# Patient Record
Sex: MALE | Race: ASIAN | NOT HISPANIC OR LATINO | URBAN - METROPOLITAN AREA
[De-identification: names, ages, dates, MRNs, and addresses within clinical notes are randomized per-mention and may not be internally consistent; named-entity substitution may affect disease eponyms.]

---

## 2018-04-03 ENCOUNTER — INPATIENT (INPATIENT)
Facility: HOSPITAL | Age: 83
LOS: 5 days | Discharge: HOME | End: 2018-04-09
Attending: HOSPITALIST | Admitting: HOSPITALIST
Payer: MEDICAID

## 2018-04-03 VITALS — HEART RATE: 69 BPM | DIASTOLIC BLOOD PRESSURE: 90 MMHG | SYSTOLIC BLOOD PRESSURE: 151 MMHG | OXYGEN SATURATION: 99 %

## 2018-04-03 DIAGNOSIS — I63.9 CEREBRAL INFARCTION, UNSPECIFIED: ICD-10-CM

## 2018-04-03 DIAGNOSIS — Z98.41 CATARACT EXTRACTION STATUS, RIGHT EYE: Chronic | ICD-10-CM

## 2018-04-03 DIAGNOSIS — E11.9 TYPE 2 DIABETES MELLITUS WITHOUT COMPLICATIONS: ICD-10-CM

## 2018-04-03 DIAGNOSIS — I48.91 UNSPECIFIED ATRIAL FIBRILLATION: ICD-10-CM

## 2018-04-03 DIAGNOSIS — I10 ESSENTIAL (PRIMARY) HYPERTENSION: ICD-10-CM

## 2018-04-03 DIAGNOSIS — I48.92 UNSPECIFIED ATRIAL FLUTTER: ICD-10-CM

## 2018-04-03 DIAGNOSIS — Z29.9 ENCOUNTER FOR PROPHYLACTIC MEASURES, UNSPECIFIED: ICD-10-CM

## 2018-04-03 LAB
ALBUMIN SERPL ELPH-MCNC: 4.1 G/DL — SIGNIFICANT CHANGE UP (ref 3.3–5)
ALP SERPL-CCNC: 50 U/L — SIGNIFICANT CHANGE UP (ref 40–120)
ALT FLD-CCNC: 23 U/L — SIGNIFICANT CHANGE UP (ref 4–41)
APTT BLD: 32.5 SEC — SIGNIFICANT CHANGE UP (ref 27.5–37.4)
AST SERPL-CCNC: 21 U/L — SIGNIFICANT CHANGE UP (ref 4–40)
BASOPHILS # BLD AUTO: 0.05 K/UL — SIGNIFICANT CHANGE UP (ref 0–0.2)
BASOPHILS NFR BLD AUTO: 0.7 % — SIGNIFICANT CHANGE UP (ref 0–2)
BILIRUB SERPL-MCNC: 0.5 MG/DL — SIGNIFICANT CHANGE UP (ref 0.2–1.2)
BLD GP AB SCN SERPL QL: NEGATIVE — SIGNIFICANT CHANGE UP
BUN SERPL-MCNC: 24 MG/DL — HIGH (ref 7–23)
CALCIUM SERPL-MCNC: 8.9 MG/DL — SIGNIFICANT CHANGE UP (ref 8.4–10.5)
CHLORIDE SERPL-SCNC: 98 MMOL/L — SIGNIFICANT CHANGE UP (ref 98–107)
CK MB BLD-MCNC: 2.5 — SIGNIFICANT CHANGE UP (ref 0–2.5)
CK MB BLD-MCNC: 3.03 NG/ML — SIGNIFICANT CHANGE UP (ref 1–6.6)
CK MB BLD-MCNC: 3.99 NG/ML — SIGNIFICANT CHANGE UP (ref 1–6.6)
CK MB BLD-MCNC: SIGNIFICANT CHANGE UP (ref 0–2.5)
CK SERPL-CCNC: 123 U/L — SIGNIFICANT CHANGE UP (ref 30–200)
CK SERPL-CCNC: 162 U/L — SIGNIFICANT CHANGE UP (ref 30–200)
CO2 SERPL-SCNC: 24 MMOL/L — SIGNIFICANT CHANGE UP (ref 22–31)
CREAT SERPL-MCNC: 1.43 MG/DL — HIGH (ref 0.5–1.3)
EOSINOPHIL # BLD AUTO: 0.21 K/UL — SIGNIFICANT CHANGE UP (ref 0–0.5)
EOSINOPHIL NFR BLD AUTO: 2.8 % — SIGNIFICANT CHANGE UP (ref 0–6)
GLUCOSE SERPL-MCNC: 173 MG/DL — HIGH (ref 70–99)
HBA1C BLD-MCNC: 7.9 % — HIGH (ref 4–5.6)
HCT VFR BLD CALC: 42.6 % — SIGNIFICANT CHANGE UP (ref 39–50)
HGB BLD-MCNC: 13.9 G/DL — SIGNIFICANT CHANGE UP (ref 13–17)
IMM GRANULOCYTES # BLD AUTO: 0.04 # — SIGNIFICANT CHANGE UP
IMM GRANULOCYTES NFR BLD AUTO: 0.5 % — SIGNIFICANT CHANGE UP (ref 0–1.5)
INR BLD: 1.1 — SIGNIFICANT CHANGE UP (ref 0.88–1.17)
LYMPHOCYTES # BLD AUTO: 2.69 K/UL — SIGNIFICANT CHANGE UP (ref 1–3.3)
LYMPHOCYTES # BLD AUTO: 35.7 % — SIGNIFICANT CHANGE UP (ref 13–44)
MCHC RBC-ENTMCNC: 30.2 PG — SIGNIFICANT CHANGE UP (ref 27–34)
MCHC RBC-ENTMCNC: 32.6 % — SIGNIFICANT CHANGE UP (ref 32–36)
MCV RBC AUTO: 92.4 FL — SIGNIFICANT CHANGE UP (ref 80–100)
MONOCYTES # BLD AUTO: 0.93 K/UL — HIGH (ref 0–0.9)
MONOCYTES NFR BLD AUTO: 12.4 % — SIGNIFICANT CHANGE UP (ref 2–14)
NEUTROPHILS # BLD AUTO: 3.61 K/UL — SIGNIFICANT CHANGE UP (ref 1.8–7.4)
NEUTROPHILS NFR BLD AUTO: 47.9 % — SIGNIFICANT CHANGE UP (ref 43–77)
NRBC # FLD: 0 — SIGNIFICANT CHANGE UP
PLATELET # BLD AUTO: 235 K/UL — SIGNIFICANT CHANGE UP (ref 150–400)
PMV BLD: 9.5 FL — SIGNIFICANT CHANGE UP (ref 7–13)
POTASSIUM SERPL-MCNC: 4.1 MMOL/L — SIGNIFICANT CHANGE UP (ref 3.5–5.3)
POTASSIUM SERPL-SCNC: 4.1 MMOL/L — SIGNIFICANT CHANGE UP (ref 3.5–5.3)
PROT SERPL-MCNC: 7.5 G/DL — SIGNIFICANT CHANGE UP (ref 6–8.3)
PROTHROM AB SERPL-ACNC: 12.2 SEC — SIGNIFICANT CHANGE UP (ref 9.8–13.1)
RBC # BLD: 4.61 M/UL — SIGNIFICANT CHANGE UP (ref 4.2–5.8)
RBC # FLD: 14.6 % — HIGH (ref 10.3–14.5)
RH IG SCN BLD-IMP: POSITIVE — SIGNIFICANT CHANGE UP
SODIUM SERPL-SCNC: 135 MMOL/L — SIGNIFICANT CHANGE UP (ref 135–145)
TROPONIN T SERPL-MCNC: < 0.06 NG/ML — SIGNIFICANT CHANGE UP (ref 0–0.06)
TROPONIN T SERPL-MCNC: < 0.06 NG/ML — SIGNIFICANT CHANGE UP (ref 0–0.06)
WBC # BLD: 7.53 K/UL — SIGNIFICANT CHANGE UP (ref 3.8–10.5)
WBC # FLD AUTO: 7.53 K/UL — SIGNIFICANT CHANGE UP (ref 3.8–10.5)

## 2018-04-03 PROCEDURE — 99223 1ST HOSP IP/OBS HIGH 75: CPT

## 2018-04-03 PROCEDURE — 70450 CT HEAD/BRAIN W/O DYE: CPT | Mod: 26

## 2018-04-03 PROCEDURE — 71046 X-RAY EXAM CHEST 2 VIEWS: CPT | Mod: 26

## 2018-04-03 RX ORDER — ASPIRIN/CALCIUM CARB/MAGNESIUM 324 MG
300 TABLET ORAL ONCE
Qty: 0 | Refills: 0 | Status: COMPLETED | OUTPATIENT
Start: 2018-04-03 | End: 2018-04-03

## 2018-04-03 RX ORDER — INSULIN LISPRO 100/ML
VIAL (ML) SUBCUTANEOUS AT BEDTIME
Qty: 0 | Refills: 0 | Status: DISCONTINUED | OUTPATIENT
Start: 2018-04-03 | End: 2018-04-09

## 2018-04-03 RX ORDER — GLUCAGON INJECTION, SOLUTION 0.5 MG/.1ML
1 INJECTION, SOLUTION SUBCUTANEOUS ONCE
Qty: 0 | Refills: 0 | Status: DISCONTINUED | OUTPATIENT
Start: 2018-04-03 | End: 2018-04-09

## 2018-04-03 RX ORDER — INSULIN LISPRO 100/ML
VIAL (ML) SUBCUTANEOUS
Qty: 0 | Refills: 0 | Status: DISCONTINUED | OUTPATIENT
Start: 2018-04-03 | End: 2018-04-09

## 2018-04-03 RX ORDER — SODIUM CHLORIDE 9 MG/ML
1000 INJECTION INTRAMUSCULAR; INTRAVENOUS; SUBCUTANEOUS
Qty: 0 | Refills: 0 | Status: DISCONTINUED | OUTPATIENT
Start: 2018-04-03 | End: 2018-04-05

## 2018-04-03 RX ORDER — IPRATROPIUM/ALBUTEROL SULFATE 18-103MCG
3 AEROSOL WITH ADAPTER (GRAM) INHALATION EVERY 6 HOURS
Qty: 0 | Refills: 0 | Status: DISCONTINUED | OUTPATIENT
Start: 2018-04-03 | End: 2018-04-09

## 2018-04-03 RX ORDER — METFORMIN HYDROCHLORIDE 850 MG/1
0 TABLET ORAL
Qty: 0 | Refills: 0 | COMMUNITY

## 2018-04-03 RX ORDER — HEPARIN SODIUM 5000 [USP'U]/ML
5000 INJECTION INTRAVENOUS; SUBCUTANEOUS EVERY 8 HOURS
Qty: 0 | Refills: 0 | Status: DISCONTINUED | OUTPATIENT
Start: 2018-04-03 | End: 2018-04-09

## 2018-04-03 RX ORDER — ASPIRIN/CALCIUM CARB/MAGNESIUM 324 MG
300 TABLET ORAL DAILY
Qty: 0 | Refills: 0 | Status: DISCONTINUED | OUTPATIENT
Start: 2018-04-04 | End: 2018-04-05

## 2018-04-03 RX ORDER — DEXTROSE 50 % IN WATER 50 %
12.5 SYRINGE (ML) INTRAVENOUS ONCE
Qty: 0 | Refills: 0 | Status: DISCONTINUED | OUTPATIENT
Start: 2018-04-03 | End: 2018-04-09

## 2018-04-03 RX ORDER — DEXTROSE 50 % IN WATER 50 %
1 SYRINGE (ML) INTRAVENOUS ONCE
Qty: 0 | Refills: 0 | Status: DISCONTINUED | OUTPATIENT
Start: 2018-04-03 | End: 2018-04-09

## 2018-04-03 RX ORDER — ASPIRIN/CALCIUM CARB/MAGNESIUM 324 MG
324 TABLET ORAL ONCE
Qty: 0 | Refills: 0 | Status: DISCONTINUED | OUTPATIENT
Start: 2018-04-03 | End: 2018-04-03

## 2018-04-03 RX ORDER — DEXTROSE 50 % IN WATER 50 %
25 SYRINGE (ML) INTRAVENOUS ONCE
Qty: 0 | Refills: 0 | Status: DISCONTINUED | OUTPATIENT
Start: 2018-04-03 | End: 2018-04-09

## 2018-04-03 RX ADMIN — Medication 300 MILLIGRAM(S): at 15:33

## 2018-04-03 RX ADMIN — SODIUM CHLORIDE 50 MILLILITER(S): 9 INJECTION INTRAMUSCULAR; INTRAVENOUS; SUBCUTANEOUS at 19:19

## 2018-04-03 RX ADMIN — HEPARIN SODIUM 5000 UNIT(S): 5000 INJECTION INTRAVENOUS; SUBCUTANEOUS at 23:13

## 2018-04-03 NOTE — ED ADULT TRIAGE NOTE - CHIEF COMPLAINT QUOTE
pt brought form home by EMS s/p call from son stating pt began taking "Strange" approx 1 hour ago, brought directly to CT by EMS

## 2018-04-03 NOTE — CONSULT NOTE ADULT - ASSESSMENT
82 year old Male (unknown PMHx upon presentation) as pt is Malayala speaking and unable to provide hx due to possible cognitive dysfunction BIBEMS as stroke notification for LUE weakness, AMS, slurred speech that began at 12:30PM.  Neurological exam significant for mild L facial droop and dysarthria.  CTH showing no acute abnormalities.  NIHSS 2, MRS 0, tPA not administered as patient out of window.  Symptoms possibly from stroke, will recommend workup    Plan:  Would get MRI brain without contrast to look at the extent and distribution of the stroke   MRA H/N to look at the cerebral vasculature or CTA H/N to look at the cerebral vasculature.   Aspirin for secondary stroke prevention at this time. Atorvastatin 80, titrate the dose according to LDL.   TTE with bubble study and telemetry to look for a cardiac source of embolism.   DVT prophylaxis, Neurochecks  Permissive HTN up to 220/120 mmHg for first 24 hours after the symptom onset followed by gradual normotension.   HbA1C and LDL.   PT/OT/Speech and swallow/safety eval.

## 2018-04-03 NOTE — H&P ADULT - PROBLEM SELECTOR PLAN 2
tele monitor  CHADS2 score=5  Discussed with Neuro fellow: "No full anticoagulation due to acute stroke and high risk of hemorrhagic conversion. Continue ASA low dose."  TTE ordered Newly diagnosed atrial flutter on ECG. At risk of hemorrhagic conversion so now a/c for now.  tele monitor  CHADS2 score=5  Discussed with Neuro fellow: "No full anticoagulation due to acute stroke and high risk of hemorrhagic conversion. Continue ASA low dose."  TTE ordered

## 2018-04-03 NOTE — H&P ADULT - ASSESSMENT
81 yo male PMHx of HTN and DM p/w slurred speech, left facial droop and ataxia admitted to tele for Stroke.    +Stroke-->MRI/MRA Head+Neck, TTE, PT/OT/S&S  +New Afib-->ASA for now, TTE 81 yo male PMHx of HTN and DM p/w slurred speech, left facial droop and ataxia admitted to tele for Stroke.    +Stroke-->MRI/MRA Head+Neck, TTE, PT/OT/S&S  +New Aflutter-->ASA for now, TTE 82 M PMHx of HTN and DM p/w slurred speech, left facial droop and ataxia admitted to tele for Stroke.    +Stroke-->MRI/MRA Head+Neck, TTE, PT/OT/S&S  +New Aflutter-->ASA for now, TTE

## 2018-04-03 NOTE — ED PROVIDER NOTE - MEDICAL DECISION MAKING DETAILS
82M with slurred speech and left facial droop, r/o acute cva. stroke code, stat CT head, check labs and coags, neuro eval.

## 2018-04-03 NOTE — ED PROVIDER NOTE - PROGRESS NOTE DETAILS
Óscar FIT att: 81 yo M (unknown PMHx upon presentation) as pt is Malayala speaking and unable to provide hx due to possible cognitive dysfunction BIBEMS as stroke notification for LUE weakness, AMS, slurred speech that began at 1:30pm according to his 15 yo grandson who is accompanying him. Grandson says that the pt's wife noticed that he was vomiting and pt himself c/o cough. EMS called a code stroke for LUE weakness. In the ED, pt has slight weakness in LUE, L sided facial droop and possible slurred speech during questioning, however, baseline is unknown. FS: 159; vitals wnl; stroke code called and stat CT ordered; neuro resident consulted and present to evaluate pt when in CT; signed out to Dr. Mccall LW: failed bedside S&S eval. will give ASA CA

## 2018-04-03 NOTE — ED PROVIDER NOTE - OBJECTIVE STATEMENT
82M with hx of HTN, cataracts p/w slurred speech and facial droop. patient last seen normal at 930am. Grandmother called her grandson with new sx at 1230pm. Patient offers no complaints. Stroke code called upon arrival, stat CT head obtained, Neuro eval.    No PMD, moved here from [place] 6 months ago  PI# [id number] for Kimberley 82M with hx of HTN, DM2, cataracts p/w slurred speech and facial droop. patient last seen normal at 930am. Grandmother called her grandson with new sx at 1230pm. Patient offers no complaints. Stroke code called upon arrival, stat CT head obtained, Neuro eval.    No PMD, moved here from [place] 6 months ago  PI# [id number] for Kimberley

## 2018-04-03 NOTE — CONSULT NOTE ADULT - SUBJECTIVE AND OBJECTIVE BOX
Neurology Consult    Name  ADAM RITTER    Patient is an 82 year old Male (unknown PMHx upon presentation) as pt is Malayala speaking and unable to provide hx due to possible cognitive dysfunction BIBEMS as stroke notification for LUE weakness, AMS, slurred speech that began at 1:30pm according to his 15 yo grandson who is accompanying him. Grandson says that the pt's wife noticed that he was vomiting and pt himself c/o cough. EMS called a code stroke for LUE weakness. In the ED, pt has slight weakness in LUE, L sided facial droop and possible slurred speech during questioning, however, baseline is unknown.  NIHSS 2, MRS 0, tPA not administered as patient is out of window.                                                          MEDICATIONS  (STANDING):    MEDICATIONS  (PRN):      Allergies      Intolerances        Objective  Vital Signs Last 24 Hrs  T(C): --  T(F): --  HR: 69 (03 Apr 2018 14:00) (69 - 69)  BP: 151/90 (03 Apr 2018 14:00) (151/90 - 151/90)  BP(mean): --  RR: --  SpO2: 99% (03 Apr 2018 14:00) (99% - 99%)    General Exam   General appearance: No acute distress, well-nourished  Respiratory:    non-labored respirations               Neurological Exam  Mental Status:  alert and oriented x3, fluent speech, following commands, repetition and naming intact    Cranial Nerves: PERRL, EOMI without nystagmus, visual fields intact mild L facial droop, moderate dysarthria    Motor:   Tone:   normal               Strength:  Upper extremity                          Delt       Bicep    Tricep                                                  R         5/5        5/5        5/5       5/5                                               L          5/5        5/5        5/5      5/5    Lower extremity                           HF          KE          KF        DF         PF                                               R        5/5        5/5        5/5       5/5       5/5                                               L         5/5        5/5        5/5      5/5        5/5    Pronator drift:   none           Dysmetria: none with finger-to-nose testing  Tremor:  none appreciated at rest or in action    Sensation: intact grossly to light touch      Other Studies        Radiology    CTH:    No acute abnormalities

## 2018-04-03 NOTE — ED ADULT NURSE NOTE - OBJECTIVE STATEMENT
Facilitator :  Receive pt in  spot 5 stroke notification , brought in with change in mental status, , L sided weakness, slurred speech and L sided facial droop now resolving.  Pt is Citizen of the [place]  speaking , grandson at bedside and translated.  CT completed, Neuro at bedside.  IV's placed by EMS and in ED.  Lung sounds clear,.  resp even and unlabored. Skin intact. Facilitator :  Receive pt in  spot 5 stroke notification , brought in with change in mental status, , L sided weakness, slurred speech and L sided facial droop .  Pt is Sw[place]  speaking , grandson at bedside and translated.  CT completed, Neuro at bedside.  IV's placed by EMS and in ED.  Lung sounds clear,.  resp even and unlabored. Skin intact.  Pt NSR on cardiac monitoring

## 2018-04-03 NOTE — H&P ADULT - NEGATIVE CARDIOVASCULAR SYMPTOMS
no chest pain/no dyspnea on exertion/no claudication/no orthopnea/no paroxysmal nocturnal dyspnea/no peripheral edema/no palpitations

## 2018-04-03 NOTE — ED PROVIDER NOTE - CRITICAL CARE PROVIDED
consultation with other physicians/consult w/ pt's family directly relating to pts condition/telephone consultation with the patient's family/additional history taking/direct patient care (not related to procedure)

## 2018-04-03 NOTE — ED PROVIDER NOTE - CONSTITUTIONAL, MLM
normal... Well appearing, well nourished, awake, alert, oriented to person, in no apparent distress.

## 2018-04-03 NOTE — ED ADULT NURSE REASSESSMENT NOTE - NS ED NURSE REASSESS COMMENT FT1
Pt is resting well family is at the bedside pt denies any complaints of having a headache no c/o of nausea or vomiting pt is following commands and moves his extremities  well at this time pt is noted with a left facial droop and has slurred speech, Cardiac monitor Sinus arrythmia noted.

## 2018-04-03 NOTE — H&P ADULT - PROBLEM SELECTOR PLAN 1
tele monitor  House Neuro following  Neuro checks Q4h  MRI + MRA Head and Neck  TTE w/ bubble  PT/OT   NPO (failed dysphagia)	  Speech and Swallow  IVF NS @ 50cc/hr for now  Permissive /210  FLP, HgA1c Presenting with dysarthria. Remains dysarthric and with left facial droop. Potential L MCA abnormality on CT. Neurology recommendations reviewed.  Tele monitor  House Neuro following  Neuro checks Q4h  MRI + MRA Head and Neck  TTE w/ bubble  PT/OT   NPO (failed dysphagia)	  Speech and Swallow  IVF NS @ 50cc/hr for now  Permissive /210  FLP, HgA1c

## 2018-04-03 NOTE — H&P ADULT - MUSCULOSKELETAL
No joint pain, swelling or deformity; no limitation of movement negative details… detailed exam ROM intact/no joint swelling/no joint erythema

## 2018-04-03 NOTE — H&P ADULT - NSHPLABSRESULTS_GEN_ALL_CORE
EKG: Atrial Flutter @ 75 bpm  Delbert x1: neg  BUN/Cr: 24/1.43  CT Head w/o con: Dense left MCA sign cannot be entirely excluded. EKG: Atrial Flutter @ 75 bpm    Delbert x1: neg    BUN/Cr: 24/1.43    CT Head w/o con: Dense left MCA signal cannot be entirely excluded.    Consultant notes reviewed: Neurology

## 2018-04-03 NOTE — ED PROVIDER NOTE - ATTENDING CONTRIBUTION TO CARE
I performed a face to face evaluation of this patient and performed a full history and physical examination on the patient.  I agree with the resident's history, physical examination, and plan of the patient.  Pt called as a code stroke- h/o HTN and DM, no cardiac hx, awake but with apparent slurred speech and left facial droop, heart s1 s2 irregularly irregular, abd soft nontender, neuro with expressive aphasia, left facial droop and possible left hand  weaker but otherwise strength and sensation wnl in ext. for r/o cva, new afib, labs, monitor, ct, admit

## 2018-04-03 NOTE — PATIENT PROFILE ADULT. - CAREGIVER ADDRESS
[facility] [id number]

## 2018-04-03 NOTE — H&P ADULT - HISTORY OF PRESENT ILLNESS
81 yo male PMHx of HTN and DM p/w slurred speech today. Pt last seen normal today @ 0930am. At 1230p pt's wife called grandson because pt wasn't able to stand up. When grandson came to see the pt he noticed pt slurring speech, described as "correct words not coming out properly and talking strange." EMS was called and pt taken to [facility] ED. Pt reports to chronic non-productive coughing and sneezing for "months." No reported changes in vision, headache, confusion, weakness, chest pain, sob, or palpitations. As per son, pt's baseline: can be forgetful at times and delayed speech at times. 82 M PMHx of HTN and DM p/w slurred speech today. Pt last seen normal today @0930am. At 1230p pt's wife called grandson because pt wasn't able to stand up. When grandson came to see the pt he noticed pt slurring speech, described as "correct words not coming out properly and talking strange." EMS was called and pt taken to [facility] ED. Pt reports to chronic non-productive coughing and sneezing for "months." No reported changes in vision, headache, confusion, weakness, chest pain, sob, or palpitations. As per son, pt's baseline: can be forgetful at times and delayed speech at times.

## 2018-04-04 DIAGNOSIS — I63.9 CEREBRAL INFARCTION, UNSPECIFIED: ICD-10-CM

## 2018-04-04 DIAGNOSIS — E11.8 TYPE 2 DIABETES MELLITUS WITH UNSPECIFIED COMPLICATIONS: ICD-10-CM

## 2018-04-04 DIAGNOSIS — I10 ESSENTIAL (PRIMARY) HYPERTENSION: ICD-10-CM

## 2018-04-04 LAB
BUN SERPL-MCNC: 23 MG/DL — SIGNIFICANT CHANGE UP (ref 7–23)
CALCIUM SERPL-MCNC: 9.1 MG/DL — SIGNIFICANT CHANGE UP (ref 8.4–10.5)
CHLORIDE SERPL-SCNC: 98 MMOL/L — SIGNIFICANT CHANGE UP (ref 98–107)
CHOLEST SERPL-MCNC: 260 MG/DL — HIGH (ref 120–199)
CO2 SERPL-SCNC: 27 MMOL/L — SIGNIFICANT CHANGE UP (ref 22–31)
CREAT SERPL-MCNC: 1.23 MG/DL — SIGNIFICANT CHANGE UP (ref 0.5–1.3)
GLUCOSE SERPL-MCNC: 130 MG/DL — HIGH (ref 70–99)
HCT VFR BLD CALC: 46.2 % — SIGNIFICANT CHANGE UP (ref 39–50)
HDLC SERPL-MCNC: 30 MG/DL — LOW (ref 35–55)
HGB BLD-MCNC: 14.7 G/DL — SIGNIFICANT CHANGE UP (ref 13–17)
LIPID PNL WITH DIRECT LDL SERPL: 206 MG/DL — SIGNIFICANT CHANGE UP
MCHC RBC-ENTMCNC: 29.4 PG — SIGNIFICANT CHANGE UP (ref 27–34)
MCHC RBC-ENTMCNC: 31.8 % — LOW (ref 32–36)
MCV RBC AUTO: 92.4 FL — SIGNIFICANT CHANGE UP (ref 80–100)
NRBC # FLD: 0 — SIGNIFICANT CHANGE UP
PLATELET # BLD AUTO: 217 K/UL — SIGNIFICANT CHANGE UP (ref 150–400)
PMV BLD: 10 FL — SIGNIFICANT CHANGE UP (ref 7–13)
POTASSIUM SERPL-MCNC: 4.2 MMOL/L — SIGNIFICANT CHANGE UP (ref 3.5–5.3)
POTASSIUM SERPL-SCNC: 4.2 MMOL/L — SIGNIFICANT CHANGE UP (ref 3.5–5.3)
RBC # BLD: 5 M/UL — SIGNIFICANT CHANGE UP (ref 4.2–5.8)
RBC # FLD: 14.7 % — HIGH (ref 10.3–14.5)
SODIUM SERPL-SCNC: 136 MMOL/L — SIGNIFICANT CHANGE UP (ref 135–145)
TRIGL SERPL-MCNC: 235 MG/DL — HIGH (ref 10–149)
WBC # BLD: 8.22 K/UL — SIGNIFICANT CHANGE UP (ref 3.8–10.5)
WBC # FLD AUTO: 8.22 K/UL — SIGNIFICANT CHANGE UP (ref 3.8–10.5)

## 2018-04-04 PROCEDURE — 70551 MRI BRAIN STEM W/O DYE: CPT | Mod: 26

## 2018-04-04 PROCEDURE — 99223 1ST HOSP IP/OBS HIGH 75: CPT

## 2018-04-04 PROCEDURE — 12345: CPT | Mod: NC

## 2018-04-04 PROCEDURE — 70548 MR ANGIOGRAPHY NECK W/DYE: CPT | Mod: 26

## 2018-04-04 PROCEDURE — 93010 ELECTROCARDIOGRAM REPORT: CPT

## 2018-04-04 RX ORDER — WARFARIN SODIUM 2.5 MG/1
5 TABLET ORAL ONCE
Qty: 0 | Refills: 0 | Status: COMPLETED | OUTPATIENT
Start: 2018-04-04 | End: 2018-04-04

## 2018-04-04 RX ADMIN — HEPARIN SODIUM 5000 UNIT(S): 5000 INJECTION INTRAVENOUS; SUBCUTANEOUS at 11:24

## 2018-04-04 RX ADMIN — HEPARIN SODIUM 5000 UNIT(S): 5000 INJECTION INTRAVENOUS; SUBCUTANEOUS at 21:34

## 2018-04-04 RX ADMIN — HEPARIN SODIUM 5000 UNIT(S): 5000 INJECTION INTRAVENOUS; SUBCUTANEOUS at 05:26

## 2018-04-04 RX ADMIN — Medication 1: at 17:58

## 2018-04-04 RX ADMIN — Medication 300 MILLIGRAM(S): at 11:24

## 2018-04-04 RX ADMIN — SODIUM CHLORIDE 50 MILLILITER(S): 9 INJECTION INTRAMUSCULAR; INTRAVENOUS; SUBCUTANEOUS at 17:07

## 2018-04-04 RX ADMIN — WARFARIN SODIUM 5 MILLIGRAM(S): 2.5 TABLET ORAL at 17:07

## 2018-04-04 RX ADMIN — SODIUM CHLORIDE 50 MILLILITER(S): 9 INJECTION INTRAMUSCULAR; INTRAVENOUS; SUBCUTANEOUS at 21:34

## 2018-04-04 NOTE — OCCUPATIONAL THERAPY INITIAL EVALUATION ADULT - VISUAL ASSESSMENT: VISUAL FIELD CUTS
Unable to accurately assess. Pt unable to follow OT commands/cues. However, pt noted to have increased difficulty located items in left visual field.

## 2018-04-04 NOTE — OCCUPATIONAL THERAPY INITIAL EVALUATION ADULT - DIAGNOSIS, OT EVAL
Impulsivity, left visual field cut?, decreased standing balance, decreased functional mobility, decreased ADL independence

## 2018-04-04 NOTE — SWALLOW BEDSIDE ASSESSMENT ADULT - SWALLOW EVAL: RECOMMENDED FEEDING/EATING TECHNIQUES
allow for swallow between intakes/oral hygiene/small sips/bites/position upright (90 degrees)/crush medication (when feasible)/maintain upright posture during/after eating for 30 mins

## 2018-04-04 NOTE — DISCHARGE NOTE ADULT - MEDICATION SUMMARY - MEDICATIONS TO STOP TAKING
I will STOP taking the medications listed below when I get home from the hospital:    amLODIPine 5 mg oral tablet  -- 1 tab(s) by mouth once a day    aspirin 81 mg oral tablet  -- 1 tab(s) by mouth 2-3x/week

## 2018-04-04 NOTE — PROGRESS NOTE ADULT - SUBJECTIVE AND OBJECTIVE BOX
CC: F/U for CVA    SUBJECTIVE / OVERNIGHT EVENTS:  Still with mild difficulty with word searching. Trouble focusing with vision.  No F/C, N/V, CP, SOB, Cough, lightheadedness, dizziness, abdominal pain, diarrhea, dysuria.    Aflutter seen at 00:48 on 4/4.  Strip printed and placed in chart.    MEDICATIONS  (STANDING):  aspirin Suppository 300 milliGRAM(s) Rectal daily  dextrose 50% Injectable 12.5 Gram(s) IV Push once  dextrose 50% Injectable 25 Gram(s) IV Push once  dextrose 50% Injectable 25 Gram(s) IV Push once  heparin  Injectable 5000 Unit(s) SubCutaneous every 8 hours  insulin lispro (HumaLOG) corrective regimen sliding scale   SubCutaneous three times a day before meals  insulin lispro (HumaLOG) corrective regimen sliding scale   SubCutaneous at bedtime  sodium chloride 0.9%. 1000 milliLiter(s) (50 mL/Hr) IV Continuous <Continuous>  warfarin 5 milliGRAM(s) Oral once    MEDICATIONS  (PRN):  ALBUTerol/ipratropium for Nebulization 3 milliLiter(s) Nebulizer every 6 hours PRN Shortness of Breath and/or Wheezing  dextrose Gel 1 Dose(s) Oral once PRN Blood Glucose LESS THAN 70 milliGRAM(s)/deciliter  glucagon  Injectable 1 milliGRAM(s) IntraMuscular once PRN Glucose LESS THAN 70 milligrams/deciliter      Vital Signs Last 24 Hrs  T(C): 36.8 (04 Apr 2018 12:48), Max: 37 (04 Apr 2018 05:24)  T(F): 98.2 (04 Apr 2018 12:48), Max: 98.6 (04 Apr 2018 05:24)  HR: 71 (04 Apr 2018 12:48) (52 - 81)  BP: 129/79 (04 Apr 2018 12:48) (112/72 - 151/90)  BP(mean): --  RR: 18 (04 Apr 2018 12:48) (16 - 22)  SpO2: 97% (04 Apr 2018 12:48) (94% - 100%)  CAPILLARY BLOOD GLUCOSE  159 (03 Apr 2018 14:17)      POCT Blood Glucose.: 144 mg/dL (04 Apr 2018 12:34)  POCT Blood Glucose.: 125 mg/dL (04 Apr 2018 06:05)  POCT Blood Glucose.: 102 mg/dL (03 Apr 2018 22:55)  POCT Blood Glucose.: 130 mg/dL (03 Apr 2018 18:14)  POCT Blood Glucose.: 159 mg/dL (03 Apr 2018 14:01)    I&O's Summary      PHYSICAL EXAM:  GENERAL: NAD, well-developed  HEAD:  Atraumatic, Normocephalic  EYES: EOMI, PERRLA, conjunctiva and sclera clear, mild diplopia  NECK: Supple, No JVD  CHEST/LUNG: Clear to auscultation bilaterally; No wheeze  HEART: Regular rate and rhythm; No murmurs, rubs, or gallops  ABDOMEN: Soft, Nontender, Nondistended; Bowel sounds present  EXTREMITIES:  2+ Peripheral Pulses, No clubbing, cyanosis, or edema  PSYCH: Calm  NEUROLOGY: A/Ox3, mild dysarthria  SKIN: No rashes or lesions    LABS:                        14.7   8.22  )-----------( 217      ( 04 Apr 2018 06:30 )             46.2     04-04    136  |  98  |  23  ----------------------------<  130<H>  4.2   |  27  |  1.23    Ca    9.1      04 Apr 2018 06:30    TPro  7.5  /  Alb  4.1  /  TBili  0.5  /  DBili  x   /  AST  21  /  ALT  23  /  AlkPhos  50  04-03    PT/INR - ( 03 Apr 2018 14:30 )   PT: 12.2 SEC;   INR: 1.10          PTT - ( 03 Apr 2018 14:30 )  PTT:32.5 SEC  CARDIAC MARKERS ( 03 Apr 2018 21:35 )  x     / < 0.06 ng/mL / 162 u/L / 3.99 ng/mL / x      CARDIAC MARKERS ( 03 Apr 2018 14:30 )  x     / < 0.06 ng/mL / 123 u/L / 3.03 ng/mL / x              RADIOLOGY & ADDITIONAL TESTS:  < from: MR Head No Cont (04.04.18 @ 02:27) >  IMPRESSION:  On brain MR imaging, there is an acute versus acute/subacute right MCA   territory infarction involving the temporal parietal regions. Similar   appearing age but smaller acute versus acute/subacute left MCA territory   infarctions involving the left parietal and frontal lobes.  Intracranial MR angiography demonstrates no significant stenosis,   aneurysm or vascular malformation.  Extracranial MR angiography demonstrates no flow-limiting stenosis by   NASCET criteria.                   BLANQUITA MEDINA M.D., ATTENDING RADIOLOGIST  This document has been electronically signed. Apr 4 2018  8:37AM    < end of copied text >    Imaging Personally Reviewed:    Care Discussed with Consultants/Other Providers: Neurology - Dr. Turk - discussion of stroke management.

## 2018-04-04 NOTE — DISCHARGE NOTE ADULT - OTHER SIGNIFICANT FINDINGS
EKG: Atrial Flutter @ 75 bpm   EKst degree AVB 60bpm TWI V1-V2  Delbert x2: neg  BUN/Cr: 24/1.43  Hgb 7.9  CT Head w/o con: Dense left MCA sign cannot be entirely excluded.  MRI/MRA  On brain MR imaging, there is an acute versus acute/subacute right MCA territory infarction involving the temporal parietal regions. Similar appearing age but smaller acute versus acute/subacute left MCA territory infarctions involving the left parietal and frontal lobes.  Intracranial MR angiography demonstrates no significant stenosis,   aneurysm or vascular malformation.     renal US: Limited study. Visualized kidneys are unremarkable. EKG: Atrial Flutter @ 75 bpm   EKst degree AVB 60bpm TWI V1-V2  Delbert x2: neg  BUN/Cr: 24/1.43  Hgb 7.9  CT Head w/o con: Dense left MCA sign cannot be entirely excluded.  MRI/MRA  On brain MR imaging, there is an acute versus acute/subacute right MCA territory infarction involving the temporal parietal regions. Similar appearing age but smaller acute versus acute/subacute left MCA territory infarctions involving the left parietal and frontal lobes.  Intracranial MR angiography demonstrates no significant stenosis,   aneurysm or vascular malformation.     renal US: Limited study. Visualized kidneys are unremarkable.   TTE with bubble: CONCLUSIONS:  1. Mitral annular calcification, otherwise normal mitral  valve. Mild mitral regurgitation.  2. Normal left ventricular internal dimensions and wall  thicknesses.  3. Endocardium not well visualized; grossly normal left  ventricular systolic function.   Endocardial visualization  enhanced with intravenous injection of echo contrast  (Definity).  4. Mild diastolic dysfunction (Stage I).  5. The right ventricle is not well visualized; grossly  normal right ventricular systolic function.  6. Non-diagnostic bubble study.  *** Technically difficult study. No obvious embolic source  noted in this study. Consider DAGMAR if clinically indicated.  *** No previous Echo exam.

## 2018-04-04 NOTE — DISCHARGE NOTE ADULT - PATIENT PORTAL LINK FT
You can access the [facility], offered by [facility], by registering with the following website: [email or web address]

## 2018-04-04 NOTE — DISCHARGE NOTE ADULT - CARE PROVIDER_API CALL
Bernabe Turk (ROBINSON), Neurology; Vascular Neurology  [address] [address]  Phone: [phone]  Fax: [phone] Bernabe Turk (ROBINSON), Neurology; Vascular Neurology  [address] [address]  Phone: [phone]  Fax: [phone]    Evan Steen), Internal Medicine  [id number] [place] [address]  Phone: [phone]  Fax: [phone]

## 2018-04-04 NOTE — DISCHARGE NOTE ADULT - PLAN OF CARE
To help recover or improve as much as possible your sensory and motor abilities, to become more independent, and prevent future strokes. Continue physical therapy and skills learned for rehabilitation.  Follow up with your neurologist in 1-2 weeks for further medical management.  Continue to take your medications as prescribed, low salt, low fat, and diabetic diet.  Consider joining a support group for stroke survivors for emotional support to prevent depression. To restore or maintain a normal heart rate and rhythm, to prevent blood clots, and decrease the risks of stroke CVA/TIA. Please take your medications as prescribed.  Continue to take your blood thinner as prescribed and follow with your physician to monitor your levels.  Low fat diet, reduce caffeine intake, and exercise at least 30 minutes daily. To maintain a normal blood glucose level and HgA1C level < 5.7 and to prevent diabetic complications such as uncontrolled diabetes, diabetic coma, blindness, renal failure, and amputations. Monitor finger sticks pre-meal and bedtime, low salt, fat and carbohydrate diet, minimize glucose intake.  Exercise daily for at least 30 minutes and weight loss.  Follow up with primary care physician and endocrinologist for routine Hemoglobin A1C checks and management.  Follow up with your ophthalmologist for routine yearly vision exams. To maintain a normal blood pressure to prevent heart attack, stroke and renal failure. Low sodium and fat diet, continue anti-hypertensive medications, and follow up with primary care physician. Continue physical therapy and skills learned for further rehabilitation/recovery.  Follow up with your neurologist in 1-2 weeks for further medical management.  Continue to take your medications as prescribed, low salt, low fat, and diabetic diet.  Consider joining a support group for stroke survivors for emotional support to prevent depression. Low sodium and fat diet, continue anti-hypertensive medications, and follow up with primary care physician. Discuss with your doctor if you should start on a medication like lisinopril or losartan as you have diabetes (for the kidney protective effects of these medications).

## 2018-04-04 NOTE — DISCHARGE NOTE ADULT - HOSPITAL COURSE
82 M PMHx of HTN and DM p/w slurred speech today. Pt last seen normal today @0930am. At 1230p pt's wife called grandson because pt wasn't able to stand up. When grandson came to see the pt he noticed pt slurring speech, described as "correct words not coming out properly and talking strange." EMS was called and pt taken to [facility] ED. Pt reports to chronic non-productive coughing and sneezing for "months." No reported changes in vision, headache, confusion, weakness, chest pain, sob, or palpitations. As per son, pt's baseline: can be forgetful at times and delayed speech at times.     Test Results:   EKG: Atrial Flutter @ 75 bpm   EKst degree AVB 60bpm TWI V1-V2  Delbert x2: neg  BUN/Cr: 24/1.43  Hgb 7.9  CT Head w/o con: Dense left MCA sign cannot be entirely excluded.  MRI/MRA  On brain MR imaging, there is an acute versus acute/subacute right MCA territory infarction involving the temporal parietal regions. Similar appearing age but smaller acute versus acute/subacute left MCA territory infarctions involving the left parietal and frontal lobes.  Intracranial MR angiography demonstrates no significant stenosis,   aneurysm or vascular malformation.    +Cerebrovascular accident (CVA), unspecified mechanism.  Plan: HIgh suspicion of embolic cause with new afib/aflutter.  Once hemorrhage is ruled out, initiate coumadin with no heparin bridging.  Start Lipitor. PT/OT/PMR eval but patient is a visitor from [place]. No indication for DAGMAR/ILD due to documented Afib on telemetry monitoring.     +Atrial fibrillation, new onset. Coumadin started.  Rate control if needed.  Check TTE for LV fx evaluation.   TTE:    +Essential hypertension. s/p Permissive HTN for now.     +Type 2 diabetes mellitus with complication, unspecified long term insulin use status. NISS. FS QID.     +Hypervolemia, unspecified hypervolemia type. Stop IV Fluid for DAVID. s/p gentle diuresis. 82 M PMHx of HTN and DM p/w slurred speech today. Pt last seen normal today @0930am. At 1230p pt's wife called grandson because pt wasn't able to stand up. When grandson came to see the pt he noticed pt slurring speech, described as "correct words not coming out properly and talking strange." EMS was called and pt taken [facility] ED. Pt reports to chronic non-productive coughing and sneezing for "months." No reported changes in vision, headache, confusion, weakness, chest pain, sob, or palpitations. As per son, pt's baseline: can be forgetful at times and delayed speech at times.     Test Results:   EKG: Atrial Flutter @ 75 bpm   EKst degree AVB 60bpm TWI V1-V2  Delbert x2: neg  BUN/Cr: 24/1.43  Hgb 7.9  CT Head w/o con: Dense left MCA sign cannot be entirely excluded.  MRI/MRA  On brain MR imaging, there is an acute versus acute/subacute right MCA territory infarction involving the temporal parietal regions. Similar appearing age but smaller acute versus acute/subacute left MCA territory infarctions involving the left parietal and frontal lobes.  Intracranial MR angiography demonstrates no significant stenosis,   aneurysm or vascular malformation.    +Cerebrovascular accident (CVA), unspecified mechanism.  Plan: HIgh suspicion of embolic cause with new afib/aflutter.  Once hemorrhage is ruled out, initiate coumadin with no heparin bridging.  Start Lipitor. PT/OT/PMR eval but patient is a visitor from [place]. No indication for DAGMAR/ILD due to documented Afib on telemetry monitoring.     +Atrial fibrillation, new onset. Coumadin started.  Rate control if needed.  Check TTE for LV fx evaluation.   TTE:    +Essential hypertension. s/p Permissive HTN for now.     +Type 2 diabetes mellitus with complication, unspecified long term insulin use status. NISS. FS QID.     +Hypervolemia, unspecified hypervolemia type. Stop IV Fluid for DAVID. s/p gentle diuresis.     18 s/p TTE with bubble study.  If nml pt. is stable for DC today. 82 M PMHx of HTN and DM p/w slurred speech. Pt last seen normal @0930am. At 1230p pt's wife called grandson because pt wasn't able to stand up. When grandson came to see the pt he noticed pt slurring speech, described as "correct words not coming out properly and talking strange." EMS was called and pt taken to [facility] ED. Pt reports to chronic non-productive coughing and sneezing for "months." No reported changes in vision, headache, confusion, weakness, chest pain, sob, or palpitations. As per son, pt's baseline: can be forgetful at times and delayed speech at times.     Test Results:   EKG: Atrial Flutter @ 75 bpm   EKst degree AVB 60bpm TWI V1-V2  Delbert x2: neg  BUN/Cr: 24/1.43  Hgb 7.9  CT Head w/o con: Dense left MCA sign cannot be entirely excluded.  MRI/MRA  On brain MR imaging, there is an acute versus acute/subacute right MCA territory infarction involving the temporal parietal regions. Similar appearing age but smaller acute versus acute/subacute left MCA territory infarctions involving the left parietal and frontal lobes.  Intracranial MR angiography demonstrates no significant stenosis,   aneurysm or vascular malformation.    +Cerebrovascular accident (CVA), unspecified mechanism.  Plan: High suspicion of embolic cause with new afib/aflutter. hemorrhage ruled out, initiated coumadin with no heparin bridging.  Started Lipitor. PT/OT/PMR eval but patient is a visitor from [place]. No indication for DAGMAR/ILD due to documented Afib on telemetry monitoring. Patient improved, and no longer required rehab placement.     +Atrial fibrillation, new onset. Coumadin started. On metoprolol for rate control.  TTE without clear embolic source. To followup as outpatient for INR monitoring.     +Essential hypertension. s/p Permissive HTN, BP controlled on metoprolol.     +Type 2 diabetes mellitus with complication, not on long term insulin. NISS. FS QID. A1C 7.9, to resume metformin upon discharge.    +Hypervolemia, unspecified hypervolemia type. Stop IV Fluid for DAVID. s/p gentle diuresis.     . Patient medically stable for discharge with close outpatient followup.

## 2018-04-04 NOTE — DISCHARGE NOTE ADULT - NS AS DC STROKE ED MATERIALS
Risk Factors for Stroke/Stroke Education Booklet/Stroke Warning Signs and Symptoms/Prescribed Medications/Need for Followup After Discharge/Call 911 for Stroke

## 2018-04-04 NOTE — SWALLOW BEDSIDE ASSESSMENT ADULT - COMMENTS
82 M PMHx of HTN and DM p/w slurred speech today. Pt last seen normal today @0930am. At 1230p pt's wife called grandson because pt wasn't able to stand up. When grandson came to see the pt he noticed pt slurring speech, described as "correct words not coming out properly and talking strange." EMS was called and pt taken to [facility] ED. Pt reports to chronic non-productive coughing and sneezing for "months." No reported changes in vision, headache, confusion, weakness, chest pain, sob, or palpitations. As per son, pt's baseline: can be forgetful at times and delayed speech at times.    Patient's language is Malayalam ([place]ian Language). Patient's family members are present (Son and Grandson) for translation as per patient's preference. 82 M PMHx of HTN and DM p/w slurred speech today. Pt last seen normal today @0930am. At 1230p pt's wife called grandson because pt wasn't able to stand up. When grandson came to see the pt he noticed pt slurring speech, described as "correct words not coming out properly and talking strange." EMS was called and pt taken to [facility] ED. Pt reports to chronic non-productive coughing and sneezing for "months." No reported changes in vision, headache, confusion, weakness, chest pain, sob, or palpitations. As per son, pt's baseline: can be forgetful at times and delayed speech at times.  Dx: CVA     Patient's language is Malayalam ( Language). Patient's family members are present (Son and Grandson) for translation as per patient's preference.

## 2018-04-04 NOTE — DISCHARGE NOTE ADULT - CARE PROVIDERS DIRECT ADDRESSES
[email or web address]

## 2018-04-04 NOTE — OCCUPATIONAL THERAPY INITIAL EVALUATION ADULT - PERTINENT HX OF CURRENT PROBLEM, REHAB EVAL
82 M PMHx of HTN and DM p/w slurred speech. At 1230p pt's wife called grandson because pt not able to stand up. When grandson came to see the pt he noticed pt slurring speech, described as "correct words not coming out properly and talking strange." MRI Head: acute versus acute/subacute right MCA territory infarction involving the temporal parietal regions. Similar appearing age but smaller acute versus acute/subacute left MCA territory infarctions involving the left parietal and frontal lobes.

## 2018-04-04 NOTE — DISCHARGE NOTE ADULT - CARE PLAN
Principal Discharge DX:	Cerebrovascular accident (CVA), unspecified mechanism  Goal:	To help recover or improve as much as possible your sensory and motor abilities, to become more independent, and prevent future strokes.  Assessment and plan of treatment:	Continue physical therapy and skills learned for rehabilitation.  Follow up with your neurologist in 1-2 weeks for further medical management.  Continue to take your medications as prescribed, low salt, low fat, and diabetic diet.  Consider joining a support group for stroke survivors for emotional support to prevent depression.  Secondary Diagnosis:	Atrial fibrillation, new onset  Secondary Diagnosis:	DM2 (diabetes mellitus, type 2)  Secondary Diagnosis:	Essential hypertension Principal Discharge DX:	Cerebrovascular accident (CVA), unspecified mechanism  Goal:	To help recover or improve as much as possible your sensory and motor abilities, to become more independent, and prevent future strokes.  Assessment and plan of treatment:	Continue physical therapy and skills learned for rehabilitation.  Follow up with your neurologist in 1-2 weeks for further medical management.  Continue to take your medications as prescribed, low salt, low fat, and diabetic diet.  Consider joining a support group for stroke survivors for emotional support to prevent depression.  Secondary Diagnosis:	Atrial fibrillation, new onset  Goal:	To restore or maintain a normal heart rate and rhythm, to prevent blood clots, and decrease the risks of stroke CVA/TIA.  Assessment and plan of treatment:	Please take your medications as prescribed.  Continue to take your blood thinner as prescribed and follow with your physician to monitor your levels.  Low fat diet, reduce caffeine intake, and exercise at least 30 minutes daily.  Secondary Diagnosis:	DM2 (diabetes mellitus, type 2)  Goal:	To maintain a normal blood glucose level and HgA1C level < 5.7 and to prevent diabetic complications such as uncontrolled diabetes, diabetic coma, blindness, renal failure, and amputations.  Assessment and plan of treatment:	Monitor finger sticks pre-meal and bedtime, low salt, fat and carbohydrate diet, minimize glucose intake.  Exercise daily for at least 30 minutes and weight loss.  Follow up with primary care physician and endocrinologist for routine Hemoglobin A1C checks and management.  Follow up with your ophthalmologist for routine yearly vision exams.  Secondary Diagnosis:	Essential hypertension  Goal:	To maintain a normal blood pressure to prevent heart attack, stroke and renal failure.  Assessment and plan of treatment:	Low sodium and fat diet, continue anti-hypertensive medications, and follow up with primary care physician. Principal Discharge DX:	Cerebrovascular accident (CVA), unspecified mechanism  Goal:	To help recover or improve as much as possible your sensory and motor abilities, to become more independent, and prevent future strokes.  Assessment and plan of treatment:	Continue physical therapy and skills learned for further rehabilitation/recovery.  Follow up with your neurologist in 1-2 weeks for further medical management.  Continue to take your medications as prescribed, low salt, low fat, and diabetic diet.  Consider joining a support group for stroke survivors for emotional support to prevent depression.  Secondary Diagnosis:	Atrial fibrillation, new onset  Goal:	To restore or maintain a normal heart rate and rhythm, to prevent blood clots, and decrease the risks of stroke CVA/TIA.  Assessment and plan of treatment:	Please take your medications as prescribed.  Continue to take your blood thinner as prescribed and follow with your physician to monitor your levels.  Low fat diet, reduce caffeine intake, and exercise at least 30 minutes daily.  Secondary Diagnosis:	DM2 (diabetes mellitus, type 2)  Goal:	To maintain a normal blood glucose level and HgA1C level < 5.7 and to prevent diabetic complications such as uncontrolled diabetes, diabetic coma, blindness, renal failure, and amputations.  Assessment and plan of treatment:	Monitor finger sticks pre-meal and bedtime, low salt, fat and carbohydrate diet, minimize glucose intake.  Exercise daily for at least 30 minutes and weight loss.  Follow up with primary care physician and endocrinologist for routine Hemoglobin A1C checks and management.  Follow up with your ophthalmologist for routine yearly vision exams.  Secondary Diagnosis:	Essential hypertension  Goal:	To maintain a normal blood pressure to prevent heart attack, stroke and renal failure.  Assessment and plan of treatment:	Low sodium and fat diet, continue anti-hypertensive medications, and follow up with primary care physician. Discuss with your doctor if you should start on a medication like lisinopril or losartan as you have diabetes (for the kidney protective effects of these medications).

## 2018-04-04 NOTE — SWALLOW BEDSIDE ASSESSMENT ADULT - SWALLOW EVAL: DIAGNOSIS
Patient presents with Functional Oral and Pharyngeal Stage Swallowing mechanism characterized by adequate oral containment, adequate chewing for solid, adequate bolus manipulation and transport with adequate oral clearance.  There is laryngeal elevation upon palpation, initiation of the pharyngeal swallow. There were no overt signs of impaired airway protection for all PO intake trials.

## 2018-04-04 NOTE — DISCHARGE NOTE ADULT - ADDITIONAL INSTRUCTIONS
Please contact the Medicine Clinic at the [facility] 3rd floor Oncology Building, [phone] to schedule an appointment within 1 week to establish care and monitor your INR levels and adjust your coumadin regimen. The neurology clinic is located in the same location, please call [phone] to schedule an appointment. You can also call [phone] to schedule an appointment with the neurology clinic at [address] [place] [address] instead.

## 2018-04-05 DIAGNOSIS — E87.70 FLUID OVERLOAD, UNSPECIFIED: ICD-10-CM

## 2018-04-05 LAB
BUN SERPL-MCNC: 13 MG/DL — SIGNIFICANT CHANGE UP (ref 7–23)
CALCIUM SERPL-MCNC: 8.9 MG/DL — SIGNIFICANT CHANGE UP (ref 8.4–10.5)
CHLORIDE SERPL-SCNC: 96 MMOL/L — LOW (ref 98–107)
CO2 SERPL-SCNC: 21 MMOL/L — LOW (ref 22–31)
CREAT SERPL-MCNC: 1.05 MG/DL — SIGNIFICANT CHANGE UP (ref 0.5–1.3)
GLUCOSE SERPL-MCNC: 180 MG/DL — HIGH (ref 70–99)
HCT VFR BLD CALC: 43.6 % — SIGNIFICANT CHANGE UP (ref 39–50)
HGB BLD-MCNC: 14.7 G/DL — SIGNIFICANT CHANGE UP (ref 13–17)
INR BLD: 1.2 — HIGH (ref 0.88–1.17)
MCHC RBC-ENTMCNC: 30.5 PG — SIGNIFICANT CHANGE UP (ref 27–34)
MCHC RBC-ENTMCNC: 33.7 % — SIGNIFICANT CHANGE UP (ref 32–36)
MCV RBC AUTO: 90.5 FL — SIGNIFICANT CHANGE UP (ref 80–100)
NRBC # FLD: 0 — SIGNIFICANT CHANGE UP
PLATELET # BLD AUTO: 241 K/UL — SIGNIFICANT CHANGE UP (ref 150–400)
PMV BLD: 9.7 FL — SIGNIFICANT CHANGE UP (ref 7–13)
POTASSIUM SERPL-MCNC: 4 MMOL/L — SIGNIFICANT CHANGE UP (ref 3.5–5.3)
POTASSIUM SERPL-SCNC: 4 MMOL/L — SIGNIFICANT CHANGE UP (ref 3.5–5.3)
PROTHROM AB SERPL-ACNC: 13.4 SEC — HIGH (ref 9.8–13.1)
RBC # BLD: 4.82 M/UL — SIGNIFICANT CHANGE UP (ref 4.2–5.8)
RBC # FLD: 14.2 % — SIGNIFICANT CHANGE UP (ref 10.3–14.5)
SODIUM SERPL-SCNC: 133 MMOL/L — LOW (ref 135–145)
WBC # BLD: 8.56 K/UL — SIGNIFICANT CHANGE UP (ref 3.8–10.5)
WBC # FLD AUTO: 8.56 K/UL — SIGNIFICANT CHANGE UP (ref 3.8–10.5)

## 2018-04-05 PROCEDURE — 99233 SBSQ HOSP IP/OBS HIGH 50: CPT

## 2018-04-05 RX ORDER — FUROSEMIDE 40 MG
20 TABLET ORAL ONCE
Qty: 0 | Refills: 0 | Status: COMPLETED | OUTPATIENT
Start: 2018-04-05 | End: 2018-04-05

## 2018-04-05 RX ORDER — WARFARIN SODIUM 2.5 MG/1
5 TABLET ORAL ONCE
Qty: 0 | Refills: 0 | Status: COMPLETED | OUTPATIENT
Start: 2018-04-05 | End: 2018-04-05

## 2018-04-05 RX ORDER — METOPROLOL TARTRATE 50 MG
25 TABLET ORAL
Qty: 0 | Refills: 0 | Status: DISCONTINUED | OUTPATIENT
Start: 2018-04-05 | End: 2018-04-09

## 2018-04-05 RX ORDER — ATORVASTATIN CALCIUM 80 MG/1
80 TABLET, FILM COATED ORAL AT BEDTIME
Qty: 0 | Refills: 0 | Status: DISCONTINUED | OUTPATIENT
Start: 2018-04-05 | End: 2018-04-09

## 2018-04-05 RX ORDER — LISINOPRIL 2.5 MG/1
5 TABLET ORAL DAILY
Qty: 0 | Refills: 0 | Status: DISCONTINUED | OUTPATIENT
Start: 2018-04-05 | End: 2018-04-06

## 2018-04-05 RX ADMIN — HEPARIN SODIUM 5000 UNIT(S): 5000 INJECTION INTRAVENOUS; SUBCUTANEOUS at 21:36

## 2018-04-05 RX ADMIN — HEPARIN SODIUM 5000 UNIT(S): 5000 INJECTION INTRAVENOUS; SUBCUTANEOUS at 05:00

## 2018-04-05 RX ADMIN — ATORVASTATIN CALCIUM 80 MILLIGRAM(S): 80 TABLET, FILM COATED ORAL at 21:36

## 2018-04-05 RX ADMIN — SODIUM CHLORIDE 50 MILLILITER(S): 9 INJECTION INTRAMUSCULAR; INTRAVENOUS; SUBCUTANEOUS at 04:59

## 2018-04-05 RX ADMIN — Medication 1: at 18:29

## 2018-04-05 RX ADMIN — LISINOPRIL 5 MILLIGRAM(S): 2.5 TABLET ORAL at 12:55

## 2018-04-05 RX ADMIN — Medication 3 MILLILITER(S): at 05:30

## 2018-04-05 RX ADMIN — Medication 1: at 09:17

## 2018-04-05 RX ADMIN — Medication 25 MILLIGRAM(S): at 17:00

## 2018-04-05 RX ADMIN — Medication 1: at 12:55

## 2018-04-05 RX ADMIN — Medication 20 MILLIGRAM(S): at 09:17

## 2018-04-05 RX ADMIN — WARFARIN SODIUM 5 MILLIGRAM(S): 2.5 TABLET ORAL at 17:00

## 2018-04-05 RX ADMIN — HEPARIN SODIUM 5000 UNIT(S): 5000 INJECTION INTRAVENOUS; SUBCUTANEOUS at 14:22

## 2018-04-05 NOTE — PROGRESS NOTE ADULT - SUBJECTIVE AND OBJECTIVE BOX
CC: F/U for CVA and wheeze    SUBJECTIVE / OVERNIGHT EVENTS:  C/o mild wheeze this morning.  Mild SOB associated with it.  No F/C, N/V, CP, Cough, lightheadedness, dizziness, abdominal pain, diarrhea, dysuria.    MEDICATIONS  (STANDING):  atorvastatin 80 milliGRAM(s) Oral at bedtime  dextrose 50% Injectable 12.5 Gram(s) IV Push once  dextrose 50% Injectable 25 Gram(s) IV Push once  dextrose 50% Injectable 25 Gram(s) IV Push once  heparin  Injectable 5000 Unit(s) SubCutaneous every 8 hours  insulin lispro (HumaLOG) corrective regimen sliding scale   SubCutaneous three times a day before meals  insulin lispro (HumaLOG) corrective regimen sliding scale   SubCutaneous at bedtime  lisinopril 5 milliGRAM(s) Oral daily  warfarin 5 milliGRAM(s) Oral once    MEDICATIONS  (PRN):  ALBUTerol/ipratropium for Nebulization 3 milliLiter(s) Nebulizer every 6 hours PRN Shortness of Breath and/or Wheezing  dextrose Gel 1 Dose(s) Oral once PRN Blood Glucose LESS THAN 70 milliGRAM(s)/deciliter  glucagon  Injectable 1 milliGRAM(s) IntraMuscular once PRN Glucose LESS THAN 70 milligrams/deciliter      Vital Signs Last 24 Hrs  T(C): 36.7 (05 Apr 2018 12:53), Max: 36.8 (04 Apr 2018 16:39)  T(F): 98 (05 Apr 2018 12:53), Max: 98.3 (04 Apr 2018 16:39)  HR: 95 (05 Apr 2018 12:53) (67 - 98)  BP: 130/100 (05 Apr 2018 12:53) (130/100 - 175/104)  BP(mean): --  RR: 20 (05 Apr 2018 12:53) (16 - 20)  SpO2: 97% (05 Apr 2018 12:53) (95% - 100%)  CAPILLARY BLOOD GLUCOSE      POCT Blood Glucose.: 168 mg/dL (05 Apr 2018 12:36)  POCT Blood Glucose.: 190 mg/dL (05 Apr 2018 08:40)  POCT Blood Glucose.: 214 mg/dL (04 Apr 2018 22:50)  POCT Blood Glucose.: 152 mg/dL (04 Apr 2018 17:45)    I&O's Summary      PHYSICAL EXAM:  GENERAL: NAD, well-developed  HEAD:  Atraumatic, Normocephalic  EYES: EOMI, PERRLA, conjunctiva and sclera clear, mild diplopia  NECK: Supple, No JVD  CHEST/LUNG: Mild crackles at bases with wheeze  HEART: Regular rate and rhythm; No murmurs, rubs, or gallops  ABDOMEN: Soft, Nontender, Nondistended; Bowel sounds present  EXTREMITIES:  2+ Peripheral Pulses, No clubbing, cyanosis, or edema  PSYCH: Calm  NEUROLOGY: A/Ox3, mild dysarthria  SKIN: No rashes or lesions    LABS:                        14.7   8.56  )-----------( 241      ( 05 Apr 2018 06:24 )             43.6     04-05    133<L>  |  96<L>  |  13  ----------------------------<  180<H>  4.0   |  21<L>  |  1.05    Ca    8.9      05 Apr 2018 06:24    TPro  7.5  /  Alb  4.1  /  TBili  0.5  /  DBili  x   /  AST  21  /  ALT  23  /  AlkPhos  50  04-03    PT/INR - ( 05 Apr 2018 06:24 )   PT: 13.4 SEC;   INR: 1.20          PTT - ( 03 Apr 2018 14:30 )  PTT:32.5 SEC  CARDIAC MARKERS ( 03 Apr 2018 21:35 )  x     / < 0.06 ng/mL / 162 u/L / 3.99 ng/mL / x      CARDIAC MARKERS ( 03 Apr 2018 14:30 )  x     / < 0.06 ng/mL / 123 u/L / 3.03 ng/mL / x              RADIOLOGY & ADDITIONAL TESTS:    Imaging Personally Reviewed:    Care Discussed with Consultants/Other Providers: Neurology - Dr. Turk - discussion of stroke management.

## 2018-04-05 NOTE — PROGRESS NOTE ADULT - SUBJECTIVE AND OBJECTIVE BOX
Interval History - Patient seen and examined at bedside.  No acute issues        Subjective: Patient reports he is doing well, no complaints    MEDICATIONS  (STANDING):  atorvastatin 80 milliGRAM(s) Oral at bedtime  dextrose 50% Injectable 12.5 Gram(s) IV Push once  dextrose 50% Injectable 25 Gram(s) IV Push once  dextrose 50% Injectable 25 Gram(s) IV Push once  heparin  Injectable 5000 Unit(s) SubCutaneous every 8 hours  insulin lispro (HumaLOG) corrective regimen sliding scale   SubCutaneous three times a day before meals  insulin lispro (HumaLOG) corrective regimen sliding scale   SubCutaneous at bedtime  lisinopril 5 milliGRAM(s) Oral daily  warfarin 5 milliGRAM(s) Oral once    MEDICATIONS  (PRN):  ALBUTerol/ipratropium for Nebulization 3 milliLiter(s) Nebulizer every 6 hours PRN Shortness of Breath and/or Wheezing  dextrose Gel 1 Dose(s) Oral once PRN Blood Glucose LESS THAN 70 milliGRAM(s)/deciliter  glucagon  Injectable 1 milliGRAM(s) IntraMuscular once PRN Glucose LESS THAN 70 milligrams/deciliter      Allergies    No Known Allergies    Intolerances        Objective:   Vital Signs Last 24 Hrs  T(C): 36.1 (05 Apr 2018 08:05), Max: 36.8 (04 Apr 2018 12:48)  T(F): 97 (05 Apr 2018 08:05), Max: 98.3 (04 Apr 2018 16:39)  HR: 98 (05 Apr 2018 08:05) (67 - 98)  BP: 175/104 (05 Apr 2018 08:05) (129/79 - 175/104)  BP(mean): --  RR: 18 (05 Apr 2018 08:05) (16 - 18)  SpO2: 98% (05 Apr 2018 08:05) (95% - 100%)    Neurological Exam  Mental Status:  alert and oriented x3, fluent speech, following commands, repetition and naming intact    Cranial Nerves: PERRL, EOMI without nystagmus, no blink to threat on L, mild L facial droop, moderate dysarthria    Motor:   Tone:   normal               Strength:  Upper extremity                          Delt       Bicep    Tricep                                                  R         5/5        5/5        5/5       5/5                                               L          5/5        5/5        5/5      5/5    Lower extremity                           HF          KE          KF        DF         PF                                               R        5/5        5/5        5/5       5/5       5/5                                               L         5/5        5/5        5/5      5/5        5/5    Pronator drift:   none           Dysmetria: none with finger-to-nose testing  Tremor:  none appreciated at rest or in action    Sensation: intact grossly to light touch    Other:  CBC Full  -  ( 05 Apr 2018 06:24 )  WBC Count : 8.56 K/uL  Hemoglobin : 14.7 g/dL  Hematocrit : 43.6 %  Platelet Count - Automated : 241 K/uL  Mean Cell Volume : 90.5 fL  Mean Cell Hemoglobin : 30.5 pg  Mean Cell Hemoglobin Concentration : 33.7 %  Auto Neutrophil # : x  Auto Lymphocyte # : x  Auto Monocyte # : x  Auto Eosinophil # : x  Auto Basophil # : x  Auto Neutrophil % : x  Auto Lymphocyte % : x  Auto Monocyte % : x  Auto Eosinophil % : x  Auto Basophil % : x    04-05    133<L>  |  96<L>  |  13  ----------------------------<  180<H>  4.0   |  21<L>  |  1.05    Ca    8.9      05 Apr 2018 06:24    TPro  7.5  /  Alb  4.1  /  TBili  0.5  /  DBili  x   /  AST  21  /  ALT  23  /  AlkPhos  50  04-03    04-05    133<L>  |  96<L>  |  13  ----------------------------<  180<H>  4.0   |  21<L>  |  1.05    Ca    8.9      05 Apr 2018 06:24    TPro  7.5  /  Alb  4.1  /  TBili  0.5  /  DBili  x   /  AST  21  /  ALT  23  /  AlkPhos  50  04-03    LIVER FUNCTIONS - ( 03 Apr 2018 14:30 )  Alb: 4.1 g/dL / Pro: 7.5 g/dL / ALK PHOS: 50 u/L / ALT: 23 u/L / AST: 21 u/L / GGT: x             Imaging:     MRI/MRA  On brain MR imaging, there is an acute versus acute/subacute right MCA   territory infarction involving the temporal parietal regions. Similar   appearing age but smaller acute versus acute/subacute left MCA territory   infarctions involving the left parietal and frontal lobes.  Intracranial MR angiography demonstrates no significant stenosis,   aneurysm or vascular malformation.  Extracranial MR angiography demonstrates no flow-limiting stenosis by   NASCET criteria.

## 2018-04-06 DIAGNOSIS — N17.9 ACUTE KIDNEY FAILURE, UNSPECIFIED: ICD-10-CM

## 2018-04-06 LAB
APTT BLD: 32.7 SEC — SIGNIFICANT CHANGE UP (ref 27.5–37.4)
BUN SERPL-MCNC: 20 MG/DL — SIGNIFICANT CHANGE UP (ref 7–23)
CALCIUM SERPL-MCNC: 9 MG/DL — SIGNIFICANT CHANGE UP (ref 8.4–10.5)
CHLORIDE SERPL-SCNC: 93 MMOL/L — LOW (ref 98–107)
CO2 SERPL-SCNC: 21 MMOL/L — LOW (ref 22–31)
CREAT SERPL-MCNC: 1.42 MG/DL — HIGH (ref 0.5–1.3)
GLUCOSE SERPL-MCNC: 190 MG/DL — HIGH (ref 70–99)
HCT VFR BLD CALC: 43.7 % — SIGNIFICANT CHANGE UP (ref 39–50)
HGB BLD-MCNC: 14.8 G/DL — SIGNIFICANT CHANGE UP (ref 13–17)
INR BLD: 1.51 — HIGH (ref 0.88–1.17)
MAGNESIUM SERPL-MCNC: 2 MG/DL — SIGNIFICANT CHANGE UP (ref 1.6–2.6)
MCHC RBC-ENTMCNC: 30.2 PG — SIGNIFICANT CHANGE UP (ref 27–34)
MCHC RBC-ENTMCNC: 33.9 % — SIGNIFICANT CHANGE UP (ref 32–36)
MCV RBC AUTO: 89.2 FL — SIGNIFICANT CHANGE UP (ref 80–100)
NRBC # FLD: 0 — SIGNIFICANT CHANGE UP
PLATELET # BLD AUTO: 232 K/UL — SIGNIFICANT CHANGE UP (ref 150–400)
PMV BLD: 9.6 FL — SIGNIFICANT CHANGE UP (ref 7–13)
POTASSIUM SERPL-MCNC: 4.2 MMOL/L — SIGNIFICANT CHANGE UP (ref 3.5–5.3)
POTASSIUM SERPL-SCNC: 4.2 MMOL/L — SIGNIFICANT CHANGE UP (ref 3.5–5.3)
PROTHROM AB SERPL-ACNC: 17.5 SEC — HIGH (ref 9.8–13.1)
RBC # BLD: 4.9 M/UL — SIGNIFICANT CHANGE UP (ref 4.2–5.8)
RBC # FLD: 14.4 % — SIGNIFICANT CHANGE UP (ref 10.3–14.5)
SODIUM SERPL-SCNC: 133 MMOL/L — LOW (ref 135–145)
WBC # BLD: 7.73 K/UL — SIGNIFICANT CHANGE UP (ref 3.8–10.5)
WBC # FLD AUTO: 7.73 K/UL — SIGNIFICANT CHANGE UP (ref 3.8–10.5)

## 2018-04-06 PROCEDURE — 99233 SBSQ HOSP IP/OBS HIGH 50: CPT

## 2018-04-06 PROCEDURE — 76775 US EXAM ABDO BACK WALL LIM: CPT | Mod: 26

## 2018-04-06 RX ORDER — WARFARIN SODIUM 2.5 MG/1
5 TABLET ORAL ONCE
Qty: 0 | Refills: 0 | Status: COMPLETED | OUTPATIENT
Start: 2018-04-06 | End: 2018-04-06

## 2018-04-06 RX ORDER — DOCUSATE SODIUM 100 MG
100 CAPSULE ORAL
Qty: 0 | Refills: 0 | Status: DISCONTINUED | OUTPATIENT
Start: 2018-04-06 | End: 2018-04-09

## 2018-04-06 RX ORDER — SENNA PLUS 8.6 MG/1
2 TABLET ORAL AT BEDTIME
Qty: 0 | Refills: 0 | Status: DISCONTINUED | OUTPATIENT
Start: 2018-04-06 | End: 2018-04-09

## 2018-04-06 RX ORDER — AMLODIPINE BESYLATE 2.5 MG/1
1 TABLET ORAL
Qty: 0 | Refills: 0 | COMMUNITY

## 2018-04-06 RX ADMIN — HEPARIN SODIUM 5000 UNIT(S): 5000 INJECTION INTRAVENOUS; SUBCUTANEOUS at 13:19

## 2018-04-06 RX ADMIN — WARFARIN SODIUM 5 MILLIGRAM(S): 2.5 TABLET ORAL at 17:20

## 2018-04-06 RX ADMIN — LISINOPRIL 5 MILLIGRAM(S): 2.5 TABLET ORAL at 06:16

## 2018-04-06 RX ADMIN — HEPARIN SODIUM 5000 UNIT(S): 5000 INJECTION INTRAVENOUS; SUBCUTANEOUS at 22:22

## 2018-04-06 RX ADMIN — Medication 1: at 22:22

## 2018-04-06 RX ADMIN — Medication 1: at 13:18

## 2018-04-06 RX ADMIN — ATORVASTATIN CALCIUM 80 MILLIGRAM(S): 80 TABLET, FILM COATED ORAL at 22:23

## 2018-04-06 RX ADMIN — Medication 1: at 17:59

## 2018-04-06 RX ADMIN — Medication 1: at 09:52

## 2018-04-06 RX ADMIN — Medication 25 MILLIGRAM(S): at 17:21

## 2018-04-06 RX ADMIN — HEPARIN SODIUM 5000 UNIT(S): 5000 INJECTION INTRAVENOUS; SUBCUTANEOUS at 06:16

## 2018-04-06 RX ADMIN — Medication 25 MILLIGRAM(S): at 06:16

## 2018-04-06 NOTE — PROGRESS NOTE ADULT - SUBJECTIVE AND OBJECTIVE BOX
CC: F/U for CVA and DAVID.    SUBJECTIVE / OVERNIGHT EVENTS:  No significant improvement in neurological status.  Complaining of constipation.  No F/C, N/V, CP, SOB, Cough, lightheadedness, dizziness, abdominal pain, diarrhea, dysuria.    MEDICATIONS  (STANDING):  atorvastatin 80 milliGRAM(s) Oral at bedtime  dextrose 50% Injectable 12.5 Gram(s) IV Push once  dextrose 50% Injectable 25 Gram(s) IV Push once  dextrose 50% Injectable 25 Gram(s) IV Push once  heparin  Injectable 5000 Unit(s) SubCutaneous every 8 hours  insulin lispro (HumaLOG) corrective regimen sliding scale   SubCutaneous three times a day before meals  insulin lispro (HumaLOG) corrective regimen sliding scale   SubCutaneous at bedtime  metoprolol tartrate 25 milliGRAM(s) Oral two times a day  warfarin 5 milliGRAM(s) Oral once    MEDICATIONS  (PRN):  ALBUTerol/ipratropium for Nebulization 3 milliLiter(s) Nebulizer every 6 hours PRN Shortness of Breath and/or Wheezing  dextrose Gel 1 Dose(s) Oral once PRN Blood Glucose LESS THAN 70 milliGRAM(s)/deciliter  glucagon  Injectable 1 milliGRAM(s) IntraMuscular once PRN Glucose LESS THAN 70 milligrams/deciliter      Vital Signs Last 24 Hrs  T(C): 36.6 (06 Apr 2018 13:19), Max: 37.2 (05 Apr 2018 17:00)  T(F): 97.8 (06 Apr 2018 13:19), Max: 98.9 (05 Apr 2018 17:00)  HR: 67 (06 Apr 2018 13:19) (67 - 97)  BP: 125/82 (06 Apr 2018 13:19) (121/88 - 150/89)  BP(mean): --  RR: 18 (06 Apr 2018 13:19) (18 - 20)  SpO2: 99% (06 Apr 2018 13:19) (97% - 99%)  CAPILLARY BLOOD GLUCOSE      POCT Blood Glucose.: 158 mg/dL (06 Apr 2018 13:04)  POCT Blood Glucose.: 169 mg/dL (06 Apr 2018 09:05)  POCT Blood Glucose.: 151 mg/dL (05 Apr 2018 22:55)  POCT Blood Glucose.: 164 mg/dL (05 Apr 2018 18:16)    I&O's Summary      PHYSICAL EXAM:  GENERAL: NAD, well-developed  HEAD:  Atraumatic, Normocephalic  EYES: EOMI, PERRLA, conjunctiva and sclera clear, mild diplopia  NECK: Supple, No JVD  CHEST/LUNG: Mild crackles at bases with wheeze  HEART: Regular rate and rhythm; No murmurs, rubs, or gallops  ABDOMEN: Soft, Nontender, Nondistended; Bowel sounds present  EXTREMITIES:  2+ Peripheral Pulses, No clubbing, cyanosis, or edema  PSYCH: Calm  NEUROLOGY: A/Ox2, dysarthria  SKIN: No rashes or lesions    LABS:                        14.8   7.73  )-----------( 232      ( 06 Apr 2018 06:17 )             43.7     04-06    133<L>  |  93<L>  |  20  ----------------------------<  190<H>  4.2   |  21<L>  |  1.42<H>    Ca    9.0      06 Apr 2018 07:45  Mg     2.0     04-06      PT/INR - ( 06 Apr 2018 07:45 )   PT: 17.5 SEC;   INR: 1.51          PTT - ( 06 Apr 2018 07:45 )  PTT:32.7 SEC          RADIOLOGY & ADDITIONAL TESTS:    Imaging Personally Reviewed:    Care Discussed with Consultants/Other Providers: Neurology - Dr. Turk - discussion of stroke management.

## 2018-04-06 NOTE — PROGRESS NOTE ADULT - SUBJECTIVE AND OBJECTIVE BOX
Neurology Follow Up Note    Subjective: - Patient seen and examined at bedside. No acute issues. Daughter at bedside.       Objective:  MEDICATIONS  (STANDING):  atorvastatin 80 milliGRAM(s) Oral at bedtime  dextrose 50% Injectable 12.5 Gram(s) IV Push once  dextrose 50% Injectable 25 Gram(s) IV Push once  dextrose 50% Injectable 25 Gram(s) IV Push once  heparin  Injectable 5000 Unit(s) SubCutaneous every 8 hours  insulin lispro (HumaLOG) corrective regimen sliding scale   SubCutaneous three times a day before meals  insulin lispro (HumaLOG) corrective regimen sliding scale   SubCutaneous at bedtime  metoprolol tartrate 25 milliGRAM(s) Oral two times a day  warfarin 5 milliGRAM(s) Oral once    MEDICATIONS  (PRN):  ALBUTerol/ipratropium for Nebulization 3 milliLiter(s) Nebulizer every 6 hours PRN Shortness of Breath and/or Wheezing  dextrose Gel 1 Dose(s) Oral once PRN Blood Glucose LESS THAN 70 milliGRAM(s)/deciliter  glucagon  Injectable 1 milliGRAM(s) IntraMuscular once PRN Glucose LESS THAN 70 milligrams/deciliter        Allergies  No Known Allergies  Intolerances        Vital Signs Last 24 Hrs  T(C): 36.6 (06 Apr 2018 13:19), Max: 37.2 (05 Apr 2018 17:00)  T(F): 97.8 (06 Apr 2018 13:19), Max: 98.9 (05 Apr 2018 17:00)  HR: 67 (06 Apr 2018 13:19) (67 - 97)  BP: 125/82 (06 Apr 2018 13:19) (121/88 - 150/89)  BP(mean): --  RR: 18 (06 Apr 2018 13:19) (18 - 20)  SpO2: 99% (06 Apr 2018 13:19) (97% - 99%)    Neurological Exam  Mental Status:  alert and oriented x3, fluent speech, following commands  Cranial Nerves: PERRL, EOMI without nystagmus, no blink to threat on L, mild L facial droop, moderate dysarthria  Motor: no drift   Dysmetria: none with finger-to-nose testing  Sensation: intact grossly to light touch        LABS:                        14.8   7.73  )-----------( 232      ( 06 Apr 2018 06:17 )             43.7     04-06    133<L>  |  93<L>  |  20  ----------------------------<  190<H>  4.2   |  21<L>  |  1.42<H>    Ca    9.0      06 Apr 2018 07:45  Mg     2.0     04-06          Imaging:  MRI/MRA 4/4:  On brain MR imaging, there is an acute versus acute/subacute right MCA   territory infarction involving the temporal parietal regions. Similar   appearing age but smaller acute versus acute/subacute left MCA territory   infarctions involving the left parietal and frontal lobes.  Intracranial MR angiography demonstrates no significant stenosis,   aneurysm or vascular malformation.  Extracranial MR angiography demonstrates no flow-limiting stenosis by   NASCET criteria.

## 2018-04-07 LAB
APTT BLD: 38.1 SEC — HIGH (ref 27.5–37.4)
BUN SERPL-MCNC: 27 MG/DL — HIGH (ref 7–23)
CALCIUM SERPL-MCNC: 9.1 MG/DL — SIGNIFICANT CHANGE UP (ref 8.4–10.5)
CHLORIDE SERPL-SCNC: 96 MMOL/L — LOW (ref 98–107)
CO2 SERPL-SCNC: 21 MMOL/L — LOW (ref 22–31)
CREAT SERPL-MCNC: 1.41 MG/DL — HIGH (ref 0.5–1.3)
GLUCOSE SERPL-MCNC: 180 MG/DL — HIGH (ref 70–99)
HCT VFR BLD CALC: 42.7 % — SIGNIFICANT CHANGE UP (ref 39–50)
HGB BLD-MCNC: 14.5 G/DL — SIGNIFICANT CHANGE UP (ref 13–17)
INR BLD: 1.99 — HIGH (ref 0.88–1.17)
MAGNESIUM SERPL-MCNC: 2 MG/DL — SIGNIFICANT CHANGE UP (ref 1.6–2.6)
MCHC RBC-ENTMCNC: 30.5 PG — SIGNIFICANT CHANGE UP (ref 27–34)
MCHC RBC-ENTMCNC: 34 % — SIGNIFICANT CHANGE UP (ref 32–36)
MCV RBC AUTO: 89.9 FL — SIGNIFICANT CHANGE UP (ref 80–100)
NRBC # FLD: 0 — SIGNIFICANT CHANGE UP
PLATELET # BLD AUTO: 237 K/UL — SIGNIFICANT CHANGE UP (ref 150–400)
PMV BLD: 10.2 FL — SIGNIFICANT CHANGE UP (ref 7–13)
POTASSIUM SERPL-MCNC: 4.3 MMOL/L — SIGNIFICANT CHANGE UP (ref 3.5–5.3)
POTASSIUM SERPL-SCNC: 4.3 MMOL/L — SIGNIFICANT CHANGE UP (ref 3.5–5.3)
PROTHROM AB SERPL-ACNC: 22.4 SEC — HIGH (ref 9.8–13.1)
RBC # BLD: 4.75 M/UL — SIGNIFICANT CHANGE UP (ref 4.2–5.8)
RBC # FLD: 14.5 % — SIGNIFICANT CHANGE UP (ref 10.3–14.5)
SODIUM SERPL-SCNC: 133 MMOL/L — LOW (ref 135–145)
WBC # BLD: 6.58 K/UL — SIGNIFICANT CHANGE UP (ref 3.8–10.5)
WBC # FLD AUTO: 6.58 K/UL — SIGNIFICANT CHANGE UP (ref 3.8–10.5)

## 2018-04-07 PROCEDURE — 99233 SBSQ HOSP IP/OBS HIGH 50: CPT

## 2018-04-07 RX ORDER — WARFARIN SODIUM 2.5 MG/1
5 TABLET ORAL ONCE
Qty: 0 | Refills: 0 | Status: COMPLETED | OUTPATIENT
Start: 2018-04-07 | End: 2018-04-07

## 2018-04-07 RX ADMIN — Medication 1: at 17:57

## 2018-04-07 RX ADMIN — ATORVASTATIN CALCIUM 80 MILLIGRAM(S): 80 TABLET, FILM COATED ORAL at 21:25

## 2018-04-07 RX ADMIN — Medication 100 MILLIGRAM(S): at 21:38

## 2018-04-07 RX ADMIN — Medication 1: at 09:26

## 2018-04-07 RX ADMIN — Medication 1: at 13:02

## 2018-04-07 RX ADMIN — HEPARIN SODIUM 5000 UNIT(S): 5000 INJECTION INTRAVENOUS; SUBCUTANEOUS at 13:14

## 2018-04-07 RX ADMIN — Medication 25 MILLIGRAM(S): at 17:35

## 2018-04-07 RX ADMIN — SENNA PLUS 2 TABLET(S): 8.6 TABLET ORAL at 13:14

## 2018-04-07 RX ADMIN — HEPARIN SODIUM 5000 UNIT(S): 5000 INJECTION INTRAVENOUS; SUBCUTANEOUS at 06:03

## 2018-04-07 RX ADMIN — WARFARIN SODIUM 5 MILLIGRAM(S): 2.5 TABLET ORAL at 17:35

## 2018-04-07 RX ADMIN — HEPARIN SODIUM 5000 UNIT(S): 5000 INJECTION INTRAVENOUS; SUBCUTANEOUS at 21:25

## 2018-04-07 RX ADMIN — Medication 100 MILLIGRAM(S): at 13:14

## 2018-04-07 RX ADMIN — Medication 25 MILLIGRAM(S): at 06:02

## 2018-04-07 NOTE — PROGRESS NOTE ADULT - SUBJECTIVE AND OBJECTIVE BOX
Patient is a 82y old  Male who presents with a chief complaint of slurred speech (04 Apr 2018 10:15)      INTERVAL HPI/OVERNIGHT EVENTS:  Translation provided by family per patient request. Complaining of some intermittent nausea, but able to tolerate diet, no issues with urinating or BM. Awaiting echo.     MEDICATIONS  (STANDING):  atorvastatin 80 milliGRAM(s) Oral at bedtime  dextrose 50% Injectable 12.5 Gram(s) IV Push once  dextrose 50% Injectable 25 Gram(s) IV Push once  dextrose 50% Injectable 25 Gram(s) IV Push once  heparin  Injectable 5000 Unit(s) SubCutaneous every 8 hours  insulin lispro (HumaLOG) corrective regimen sliding scale   SubCutaneous three times a day before meals  insulin lispro (HumaLOG) corrective regimen sliding scale   SubCutaneous at bedtime  metoprolol tartrate 25 milliGRAM(s) Oral two times a day    MEDICATIONS  (PRN):  ALBUTerol/ipratropium for Nebulization 3 milliLiter(s) Nebulizer every 6 hours PRN Shortness of Breath and/or Wheezing  dextrose Gel 1 Dose(s) Oral once PRN Blood Glucose LESS THAN 70 milliGRAM(s)/deciliter  docusate sodium 100 milliGRAM(s) Oral two times a day PRN Constipation  glucagon  Injectable 1 milliGRAM(s) IntraMuscular once PRN Glucose LESS THAN 70 milligrams/deciliter  senna 2 Tablet(s) Oral at bedtime PRN Constipation    Allergies  No Known Allergies    Intolerances    REVIEW OF SYSTEMS:  Please see interval HPI:    Vital Signs Last 24 Hrs  T(C): 37.1 (07 Apr 2018 06:00), Max: 37.1 (07 Apr 2018 06:00)  T(F): 98.8 (07 Apr 2018 06:00), Max: 98.8 (07 Apr 2018 06:00)  HR: 73 (07 Apr 2018 06:00) (73 - 77)  BP: 116/78 (07 Apr 2018 06:00) (116/78 - 152/86)  BP(mean): --  RR: 18 (07 Apr 2018 06:00) (18 - 18)  SpO2: 95% (07 Apr 2018 06:00) (95% - 98%)  I&O's Detail    PHYSICAL EXAM:  GENERAL: NAD, sitting in chair  HEAD:  NC/AT  EYES: EOMI, no nystagmus  ENMT: MMM, tongue protrudes midline  NECK: supple  NERVOUS SYSTEM: moving all extremities, sensation to light touch grossly intact    CHEST/LUNG: CTAB, comfortable on RA  HEART: S1S2 RRR  ABDOMEN: soft, non-tender  EXTREMITIES:  no c/c/e    LABS:                        14.5   6.58  )-----------( 237      ( 07 Apr 2018 06:30 )             42.7     07 Apr 2018 06:30    133    |  96     |  27     ----------------------------<  180    4.3     |  21     |  1.41     Ca    9.1        07 Apr 2018 06:30  Mg     2.0       07 Apr 2018 06:30      PT/INR - ( 07 Apr 2018 06:30 )   PT: 22.4 SEC;   INR: 1.99          PTT - ( 07 Apr 2018 06:30 )  PTT:38.1 SEC  CAPILLARY BLOOD GLUCOSE      POCT Blood Glucose.: 180 mg/dL (07 Apr 2018 12:54)  POCT Blood Glucose.: 187 mg/dL (07 Apr 2018 08:57)  POCT Blood Glucose.: 273 mg/dL (06 Apr 2018 21:53)  POCT Blood Glucose.: 151 mg/dL (06 Apr 2018 17:26)    BLOOD CULTURE    RADIOLOGY & ADDITIONAL TESTS:    Imaging Personally Reviewed:  [x ] YES   Renal U/S: unremarkable kidneys    Telemetry reviewed: briefly in afib, now in sinus 1st degree    Consultant(s) Notes Reviewed:      Care Discussed with Consultants/Other Providers: TelePA

## 2018-04-08 LAB
APTT BLD: 61.8 SEC — HIGH (ref 27.5–37.4)
BUN SERPL-MCNC: 22 MG/DL — SIGNIFICANT CHANGE UP (ref 7–23)
CALCIUM SERPL-MCNC: 9 MG/DL — SIGNIFICANT CHANGE UP (ref 8.4–10.5)
CHLORIDE SERPL-SCNC: 96 MMOL/L — LOW (ref 98–107)
CO2 SERPL-SCNC: 22 MMOL/L — SIGNIFICANT CHANGE UP (ref 22–31)
CREAT SERPL-MCNC: 1.19 MG/DL — SIGNIFICANT CHANGE UP (ref 0.5–1.3)
GLUCOSE SERPL-MCNC: 230 MG/DL — HIGH (ref 70–99)
HCT VFR BLD CALC: 44.3 % — SIGNIFICANT CHANGE UP (ref 39–50)
HGB BLD-MCNC: 14.8 G/DL — SIGNIFICANT CHANGE UP (ref 13–17)
INR BLD: 2.26 — HIGH (ref 0.88–1.17)
MAGNESIUM SERPL-MCNC: 2.1 MG/DL — SIGNIFICANT CHANGE UP (ref 1.6–2.6)
MCHC RBC-ENTMCNC: 29.9 PG — SIGNIFICANT CHANGE UP (ref 27–34)
MCHC RBC-ENTMCNC: 33.4 % — SIGNIFICANT CHANGE UP (ref 32–36)
MCV RBC AUTO: 89.5 FL — SIGNIFICANT CHANGE UP (ref 80–100)
NRBC # FLD: 0 — SIGNIFICANT CHANGE UP
PLATELET # BLD AUTO: 246 K/UL — SIGNIFICANT CHANGE UP (ref 150–400)
PMV BLD: 9.4 FL — SIGNIFICANT CHANGE UP (ref 7–13)
POTASSIUM SERPL-MCNC: 4.8 MMOL/L — SIGNIFICANT CHANGE UP (ref 3.5–5.3)
POTASSIUM SERPL-SCNC: 4.8 MMOL/L — SIGNIFICANT CHANGE UP (ref 3.5–5.3)
PROTHROM AB SERPL-ACNC: 26.4 SEC — HIGH (ref 9.8–13.1)
RBC # BLD: 4.95 M/UL — SIGNIFICANT CHANGE UP (ref 4.2–5.8)
RBC # FLD: 14.3 % — SIGNIFICANT CHANGE UP (ref 10.3–14.5)
SODIUM SERPL-SCNC: 132 MMOL/L — LOW (ref 135–145)
WBC # BLD: 8.11 K/UL — SIGNIFICANT CHANGE UP (ref 3.8–10.5)
WBC # FLD AUTO: 8.11 K/UL — SIGNIFICANT CHANGE UP (ref 3.8–10.5)

## 2018-04-08 PROCEDURE — 99233 SBSQ HOSP IP/OBS HIGH 50: CPT

## 2018-04-08 RX ORDER — WARFARIN SODIUM 2.5 MG/1
5 TABLET ORAL ONCE
Qty: 0 | Refills: 0 | Status: COMPLETED | OUTPATIENT
Start: 2018-04-08 | End: 2018-04-08

## 2018-04-08 RX ADMIN — HEPARIN SODIUM 5000 UNIT(S): 5000 INJECTION INTRAVENOUS; SUBCUTANEOUS at 05:06

## 2018-04-08 RX ADMIN — Medication 2: at 09:18

## 2018-04-08 RX ADMIN — Medication 1: at 18:17

## 2018-04-08 RX ADMIN — Medication 25 MILLIGRAM(S): at 05:07

## 2018-04-08 RX ADMIN — ATORVASTATIN CALCIUM 80 MILLIGRAM(S): 80 TABLET, FILM COATED ORAL at 21:47

## 2018-04-08 RX ADMIN — HEPARIN SODIUM 5000 UNIT(S): 5000 INJECTION INTRAVENOUS; SUBCUTANEOUS at 21:47

## 2018-04-08 RX ADMIN — Medication 2: at 12:47

## 2018-04-08 RX ADMIN — WARFARIN SODIUM 5 MILLIGRAM(S): 2.5 TABLET ORAL at 18:17

## 2018-04-08 RX ADMIN — HEPARIN SODIUM 5000 UNIT(S): 5000 INJECTION INTRAVENOUS; SUBCUTANEOUS at 12:47

## 2018-04-08 RX ADMIN — Medication 3 MILLILITER(S): at 22:22

## 2018-04-08 NOTE — PROGRESS NOTE ADULT - SUBJECTIVE AND OBJECTIVE BOX
Patient is a 82y old  Male who presents with a chief complaint of slurred speech (04 Apr 2018 10:15)      INTERVAL HPI/OVERNIGHT EVENTS:  Translation provided by family per patient request. Reports that nausea is better, no complaints today. Awaiting echocardiogram and setup of home services.     MEDICATIONS  (STANDING):  atorvastatin 80 milliGRAM(s) Oral at bedtime  dextrose 50% Injectable 12.5 Gram(s) IV Push once  dextrose 50% Injectable 25 Gram(s) IV Push once  dextrose 50% Injectable 25 Gram(s) IV Push once  heparin  Injectable 5000 Unit(s) SubCutaneous every 8 hours  insulin lispro (HumaLOG) corrective regimen sliding scale   SubCutaneous three times a day before meals  insulin lispro (HumaLOG) corrective regimen sliding scale   SubCutaneous at bedtime  metoprolol tartrate 25 milliGRAM(s) Oral two times a day    MEDICATIONS  (PRN):  ALBUTerol/ipratropium for Nebulization 3 milliLiter(s) Nebulizer every 6 hours PRN Shortness of Breath and/or Wheezing  dextrose Gel 1 Dose(s) Oral once PRN Blood Glucose LESS THAN 70 milliGRAM(s)/deciliter  docusate sodium 100 milliGRAM(s) Oral two times a day PRN Constipation  glucagon  Injectable 1 milliGRAM(s) IntraMuscular once PRN Glucose LESS THAN 70 milligrams/deciliter  senna 2 Tablet(s) Oral at bedtime PRN Constipation      Allergies    No Known Allergies    Intolerances        REVIEW OF SYSTEMS:  Please see interval HPI:    Vital Signs Last 24 Hrs  T(C): 36.7 (08 Apr 2018 05:05), Max: 37.1 (07 Apr 2018 21:23)  T(F): 98.1 (08 Apr 2018 05:05), Max: 98.7 (07 Apr 2018 21:23)  HR: 58 (08 Apr 2018 06:59) (58 - 71)  BP: 128/72 (08 Apr 2018 06:59) (116/69 - 130/80)  BP(mean): --  RR: 18 (08 Apr 2018 05:05) (18 - 18)  SpO2: 99% (08 Apr 2018 05:05) (98% - 100%)  I&O's Detail        PHYSICAL EXAM:  GENERAL:   HEAD:    EYES:   ENMT:   NECK:   NERVOUS SYSTEM:    CHEST/LUNG:   HEART:   ABDOMEN:   EXTREMITIES:    LYMPH:   SKIN:     LABS:                        14.8   8.11  )-----------( 246      ( 08 Apr 2018 07:11 )             44.3     08 Apr 2018 07:11    132    |  96     |  22     ----------------------------<  230    4.8     |  22     |  1.19     Ca    9.0        08 Apr 2018 07:11  Mg     2.1       08 Apr 2018 07:11      PT/INR - ( 08 Apr 2018 07:11 )   PT: 26.4 SEC;   INR: 2.26          PTT - ( 08 Apr 2018 07:11 )  PTT:61.8 SEC  CAPILLARY BLOOD GLUCOSE      POCT Blood Glucose.: 204 mg/dL (08 Apr 2018 12:42)  POCT Blood Glucose.: 211 mg/dL (08 Apr 2018 09:06)  POCT Blood Glucose.: 185 mg/dL (07 Apr 2018 21:48)  POCT Blood Glucose.: 191 mg/dL (07 Apr 2018 17:49)    BLOOD CULTURE    RADIOLOGY & ADDITIONAL TESTS:    Imaging Personally Reviewed:  [ ] YES     Consultant(s) Notes Reviewed:      Care Discussed with Consultants/Other Providers: Patient is a 82y old  Male who presents with a chief complaint of slurred speech (04 Apr 2018 10:15)      INTERVAL HPI/OVERNIGHT EVENTS:  Translation provided by family per patient request. Reports that nausea is better, no complaints today. Awaiting echocardiogram and setup of home services.     MEDICATIONS  (STANDING):  atorvastatin 80 milliGRAM(s) Oral at bedtime  dextrose 50% Injectable 12.5 Gram(s) IV Push once  dextrose 50% Injectable 25 Gram(s) IV Push once  dextrose 50% Injectable 25 Gram(s) IV Push once  heparin  Injectable 5000 Unit(s) SubCutaneous every 8 hours  insulin lispro (HumaLOG) corrective regimen sliding scale   SubCutaneous three times a day before meals  insulin lispro (HumaLOG) corrective regimen sliding scale   SubCutaneous at bedtime  metoprolol tartrate 25 milliGRAM(s) Oral two times a day    MEDICATIONS  (PRN):  ALBUTerol/ipratropium for Nebulization 3 milliLiter(s) Nebulizer every 6 hours PRN Shortness of Breath and/or Wheezing  dextrose Gel 1 Dose(s) Oral once PRN Blood Glucose LESS THAN 70 milliGRAM(s)/deciliter  docusate sodium 100 milliGRAM(s) Oral two times a day PRN Constipation  glucagon  Injectable 1 milliGRAM(s) IntraMuscular once PRN Glucose LESS THAN 70 milligrams/deciliter  senna 2 Tablet(s) Oral at bedtime PRN Constipation      Allergies  No Known Allergies    Intolerances    REVIEW OF SYSTEMS:  Please see interval HPI:    Vital Signs Last 24 Hrs  T(C): 36.7 (08 Apr 2018 05:05), Max: 37.1 (07 Apr 2018 21:23)  T(F): 98.1 (08 Apr 2018 05:05), Max: 98.7 (07 Apr 2018 21:23)  HR: 58 (08 Apr 2018 06:59) (58 - 71)  BP: 128/72 (08 Apr 2018 06:59) (116/69 - 130/80)  BP(mean): --  RR: 18 (08 Apr 2018 05:05) (18 - 18)  SpO2: 99% (08 Apr 2018 05:05) (98% - 100%)  I&O's Detail    PHYSICAL EXAM:  GENERAL: NAD, lying in bed  HEAD:  NC/AT  EYES: EOMI  ENMT: MMM  NECK: supple  NERVOUS SYSTEM: moving all extremities, sensation to light touch grossly intact   CHEST/LUNG: CTAB  HEART: S1S2 RRR  ABDOMEN: soft, non-tender  EXTREMITIES:  no c/c/e      LABS:                        14.8   8.11  )-----------( 246      ( 08 Apr 2018 07:11 )             44.3     08 Apr 2018 07:11    132    |  96     |  22     ----------------------------<  230    4.8     |  22     |  1.19     Ca    9.0        08 Apr 2018 07:11  Mg     2.1       08 Apr 2018 07:11      PT/INR - ( 08 Apr 2018 07:11 )   PT: 26.4 SEC;   INR: 2.26          PTT - ( 08 Apr 2018 07:11 )  PTT:61.8 SEC  CAPILLARY BLOOD GLUCOSE      POCT Blood Glucose.: 204 mg/dL (08 Apr 2018 12:42)  POCT Blood Glucose.: 211 mg/dL (08 Apr 2018 09:06)  POCT Blood Glucose.: 185 mg/dL (07 Apr 2018 21:48)  POCT Blood Glucose.: 191 mg/dL (07 Apr 2018 17:49)    BLOOD CULTURE    RADIOLOGY & ADDITIONAL TESTS:    Imaging Personally Reviewed:  [ ] YES     Consultant(s) Notes Reviewed:      Care Discussed with Consultants/Other Providers: Lam

## 2018-04-09 VITALS — DIASTOLIC BLOOD PRESSURE: 98 MMHG | HEART RATE: 72 BPM | SYSTOLIC BLOOD PRESSURE: 155 MMHG

## 2018-04-09 LAB
APTT BLD: 44.3 SEC — HIGH (ref 27.5–37.4)
BUN SERPL-MCNC: 17 MG/DL — SIGNIFICANT CHANGE UP (ref 7–23)
CALCIUM SERPL-MCNC: 9.1 MG/DL — SIGNIFICANT CHANGE UP (ref 8.4–10.5)
CHLORIDE SERPL-SCNC: 96 MMOL/L — LOW (ref 98–107)
CO2 SERPL-SCNC: 20 MMOL/L — LOW (ref 22–31)
CREAT SERPL-MCNC: 1.09 MG/DL — SIGNIFICANT CHANGE UP (ref 0.5–1.3)
GLUCOSE SERPL-MCNC: 214 MG/DL — HIGH (ref 70–99)
HCT VFR BLD CALC: 43.4 % — SIGNIFICANT CHANGE UP (ref 39–50)
HGB BLD-MCNC: 14.6 G/DL — SIGNIFICANT CHANGE UP (ref 13–17)
INR BLD: 2.95 — HIGH (ref 0.88–1.17)
MAGNESIUM SERPL-MCNC: 2 MG/DL — SIGNIFICANT CHANGE UP (ref 1.6–2.6)
MCHC RBC-ENTMCNC: 30.6 PG — SIGNIFICANT CHANGE UP (ref 27–34)
MCHC RBC-ENTMCNC: 33.6 % — SIGNIFICANT CHANGE UP (ref 32–36)
MCV RBC AUTO: 91 FL — SIGNIFICANT CHANGE UP (ref 80–100)
NRBC # FLD: 0 — SIGNIFICANT CHANGE UP
PLATELET # BLD AUTO: 268 K/UL — SIGNIFICANT CHANGE UP (ref 150–400)
PMV BLD: 9.9 FL — SIGNIFICANT CHANGE UP (ref 7–13)
POTASSIUM SERPL-MCNC: 4.6 MMOL/L — SIGNIFICANT CHANGE UP (ref 3.5–5.3)
POTASSIUM SERPL-SCNC: 4.6 MMOL/L — SIGNIFICANT CHANGE UP (ref 3.5–5.3)
PROTHROM AB SERPL-ACNC: 33.5 SEC — HIGH (ref 9.8–13.1)
RBC # BLD: 4.77 M/UL — SIGNIFICANT CHANGE UP (ref 4.2–5.8)
RBC # FLD: 14.3 % — SIGNIFICANT CHANGE UP (ref 10.3–14.5)
SODIUM SERPL-SCNC: 131 MMOL/L — LOW (ref 135–145)
WBC # BLD: 7.39 K/UL — SIGNIFICANT CHANGE UP (ref 3.8–10.5)
WBC # FLD AUTO: 7.39 K/UL — SIGNIFICANT CHANGE UP (ref 3.8–10.5)

## 2018-04-09 PROCEDURE — 93306 TTE W/DOPPLER COMPLETE: CPT | Mod: 26

## 2018-04-09 PROCEDURE — 99239 HOSP IP/OBS DSCHRG MGMT >30: CPT

## 2018-04-09 RX ORDER — METOPROLOL TARTRATE 50 MG
25 TABLET ORAL ONCE
Qty: 0 | Refills: 0 | Status: COMPLETED | OUTPATIENT
Start: 2018-04-09 | End: 2018-04-09

## 2018-04-09 RX ORDER — ATORVASTATIN CALCIUM 80 MG/1
1 TABLET, FILM COATED ORAL
Qty: 30 | Refills: 0 | OUTPATIENT
Start: 2018-04-09 | End: 2018-05-08

## 2018-04-09 RX ORDER — WARFARIN SODIUM 2.5 MG/1
1 TABLET ORAL
Qty: 14 | Refills: 0 | OUTPATIENT
Start: 2018-04-09 | End: 2018-04-22

## 2018-04-09 RX ORDER — METOPROLOL TARTRATE 50 MG
50 TABLET ORAL
Qty: 0 | Refills: 0 | Status: DISCONTINUED | OUTPATIENT
Start: 2018-04-09 | End: 2018-04-09

## 2018-04-09 RX ORDER — WARFARIN SODIUM 2.5 MG/1
5 TABLET ORAL ONCE
Qty: 0 | Refills: 0 | Status: COMPLETED | OUTPATIENT
Start: 2018-04-09 | End: 2018-04-09

## 2018-04-09 RX ORDER — METOPROLOL TARTRATE 50 MG
1 TABLET ORAL
Qty: 60 | Refills: 0 | OUTPATIENT
Start: 2018-04-09 | End: 2018-05-08

## 2018-04-09 RX ORDER — ASPIRIN/CALCIUM CARB/MAGNESIUM 324 MG
1 TABLET ORAL
Qty: 0 | Refills: 0 | COMMUNITY

## 2018-04-09 RX ADMIN — Medication 25 MILLIGRAM(S): at 05:18

## 2018-04-09 RX ADMIN — WARFARIN SODIUM 5 MILLIGRAM(S): 2.5 TABLET ORAL at 17:54

## 2018-04-09 RX ADMIN — Medication 25 MILLIGRAM(S): at 10:57

## 2018-04-09 RX ADMIN — Medication 2: at 09:38

## 2018-04-09 RX ADMIN — Medication 1: at 13:03

## 2018-04-09 RX ADMIN — HEPARIN SODIUM 5000 UNIT(S): 5000 INJECTION INTRAVENOUS; SUBCUTANEOUS at 05:18

## 2018-04-09 RX ADMIN — Medication 50 MILLIGRAM(S): at 17:56

## 2018-04-09 RX ADMIN — HEPARIN SODIUM 5000 UNIT(S): 5000 INJECTION INTRAVENOUS; SUBCUTANEOUS at 13:03

## 2018-04-09 NOTE — PROGRESS NOTE ADULT - PROBLEM SELECTOR PLAN 4
NISS. FS QID.
Monitor on lopressor with hold parameter.
Monitor on lopressor with hold parameter.
NISS. FS QID.
Monitor on lopressor with hold parameter.
Monitor on lopressor with hold parameter.

## 2018-04-09 NOTE — PROGRESS NOTE ADULT - PROBLEM SELECTOR PROBLEM 1
Cerebrovascular accident (CVA), unspecified mechanism
Cerebrovascular accident (CVA), unspecified mechanism
DAVID (acute kidney injury)

## 2018-04-09 NOTE — PROGRESS NOTE ADULT - SUBJECTIVE AND OBJECTIVE BOX
Patient is a 82y old  Male who presents with a chief complaint of slurred speech (04 Apr 2018 10:15)      INTERVAL HPI/OVERNIGHT EVENTS:    MEDICATIONS  (STANDING):  atorvastatin 80 milliGRAM(s) Oral at bedtime  dextrose 50% Injectable 12.5 Gram(s) IV Push once  dextrose 50% Injectable 25 Gram(s) IV Push once  dextrose 50% Injectable 25 Gram(s) IV Push once  heparin  Injectable 5000 Unit(s) SubCutaneous every 8 hours  insulin lispro (HumaLOG) corrective regimen sliding scale   SubCutaneous three times a day before meals  insulin lispro (HumaLOG) corrective regimen sliding scale   SubCutaneous at bedtime  metoprolol tartrate 50 milliGRAM(s) Oral two times a day  warfarin 5 milliGRAM(s) Oral once    MEDICATIONS  (PRN):  ALBUTerol/ipratropium for Nebulization 3 milliLiter(s) Nebulizer every 6 hours PRN Shortness of Breath and/or Wheezing  dextrose Gel 1 Dose(s) Oral once PRN Blood Glucose LESS THAN 70 milliGRAM(s)/deciliter  docusate sodium 100 milliGRAM(s) Oral two times a day PRN Constipation  glucagon  Injectable 1 milliGRAM(s) IntraMuscular once PRN Glucose LESS THAN 70 milligrams/deciliter  senna 2 Tablet(s) Oral at bedtime PRN Constipation      Allergies    No Known Allergies    Intolerances        REVIEW OF SYSTEMS:  Please see interval HPI:    Vital Signs Last 24 Hrs  T(C): 36.7 (09 Apr 2018 05:17), Max: 36.7 (08 Apr 2018 15:32)  T(F): 98.1 (09 Apr 2018 05:17), Max: 98.1 (09 Apr 2018 05:17)  HR: 76 (09 Apr 2018 10:56) (68 - 81)  BP: 118/85 (09 Apr 2018 10:56) (101/61 - 158/84)  BP(mean): --  RR: 18 (09 Apr 2018 05:17) (16 - 18)  SpO2: 100% (09 Apr 2018 05:17) (95% - 100%)  I&O's Detail        PHYSICAL EXAM:  GENERAL:   HEAD:    EYES:   ENMT:   NECK:   NERVOUS SYSTEM:    CHEST/LUNG:   HEART:   ABDOMEN:   EXTREMITIES:    LYMPH:   SKIN:     LABS:                        14.6   7.39  )-----------( 268      ( 09 Apr 2018 06:15 )             43.4     09 Apr 2018 06:15    131    |  96     |  17     ----------------------------<  214    4.6     |  20     |  1.09     Ca    9.1        09 Apr 2018 06:15  Mg     2.0       09 Apr 2018 06:15      PT/INR - ( 09 Apr 2018 06:15 )   PT: 33.5 SEC;   INR: 2.95          PTT - ( 09 Apr 2018 06:15 )  PTT:44.3 SEC  CAPILLARY BLOOD GLUCOSE      POCT Blood Glucose.: 240 mg/dL (09 Apr 2018 09:11)  POCT Blood Glucose.: 246 mg/dL (08 Apr 2018 21:55)  POCT Blood Glucose.: 178 mg/dL (08 Apr 2018 17:51)    BLOOD CULTURE    RADIOLOGY & ADDITIONAL TESTS:    Imaging Personally Reviewed:  [ ] YES     Consultant(s) Notes Reviewed:      Care Discussed with Consultants/Other Providers: Patient is a 82y old  Male who presents with a chief complaint of slurred speech (04 Apr 2018 10:15)    INTERVAL HPI/OVERNIGHT EVENTS: Translation provided by family at bedside per patient request.   Feels well, no complaints, ready to go home, pending results of echo. Will need to followup in medicine clinic for INR check and further dose adjustments (does not have PMD). D/w family at bedside, they report that they have received coumadin teaching already. They state patient was able to walk 150 ft earlier so are happy about that.     MEDICATIONS  (STANDING):  atorvastatin 80 milliGRAM(s) Oral at bedtime  dextrose 50% Injectable 12.5 Gram(s) IV Push once  dextrose 50% Injectable 25 Gram(s) IV Push once  dextrose 50% Injectable 25 Gram(s) IV Push once  heparin  Injectable 5000 Unit(s) SubCutaneous every 8 hours  insulin lispro (HumaLOG) corrective regimen sliding scale   SubCutaneous three times a day before meals  insulin lispro (HumaLOG) corrective regimen sliding scale   SubCutaneous at bedtime  metoprolol tartrate 50 milliGRAM(s) Oral two times a day  warfarin 5 milliGRAM(s) Oral once    MEDICATIONS  (PRN):  ALBUTerol/ipratropium for Nebulization 3 milliLiter(s) Nebulizer every 6 hours PRN Shortness of Breath and/or Wheezing  dextrose Gel 1 Dose(s) Oral once PRN Blood Glucose LESS THAN 70 milliGRAM(s)/deciliter  docusate sodium 100 milliGRAM(s) Oral two times a day PRN Constipation  glucagon  Injectable 1 milliGRAM(s) IntraMuscular once PRN Glucose LESS THAN 70 milligrams/deciliter  senna 2 Tablet(s) Oral at bedtime PRN Constipation    Allergies  No Known Allergies    Intolerances    REVIEW OF SYSTEMS:  Please see interval HPI:    Vital Signs Last 24 Hrs  T(C): 36.7 (09 Apr 2018 05:17), Max: 36.7 (08 Apr 2018 15:32)  T(F): 98.1 (09 Apr 2018 05:17), Max: 98.1 (09 Apr 2018 05:17)  HR: 76 (09 Apr 2018 10:56) (68 - 81)  BP: 118/85 (09 Apr 2018 10:56) (101/61 - 158/84)  BP(mean): --  RR: 18 (09 Apr 2018 05:17) (16 - 18)  SpO2: 100% (09 Apr 2018 05:17) (95% - 100%)  I&O's Detail    PHYSICAL EXAM:  GENERAL: NAD, lying in bed  HEAD:  NC/AT  EYES: EOMI  ENMT: MMM  NECK: supple  NERVOUS SYSTEM: moving all extremities, strength grossly intact  CHEST/LUNG: CTAB  HEART: S1S2 RRR  ABDOMEN:   EXTREMITIES:    LYMPH:   SKIN:     LABS:                        14.6   7.39  )-----------( 268      ( 09 Apr 2018 06:15 )             43.4     09 Apr 2018 06:15    131    |  96     |  17     ----------------------------<  214    4.6     |  20     |  1.09     Ca    9.1        09 Apr 2018 06:15  Mg     2.0       09 Apr 2018 06:15      PT/INR - ( 09 Apr 2018 06:15 )   PT: 33.5 SEC;   INR: 2.95          PTT - ( 09 Apr 2018 06:15 )  PTT:44.3 SEC  CAPILLARY BLOOD GLUCOSE      POCT Blood Glucose.: 240 mg/dL (09 Apr 2018 09:11)  POCT Blood Glucose.: 246 mg/dL (08 Apr 2018 21:55)  POCT Blood Glucose.: 178 mg/dL (08 Apr 2018 17:51)    BLOOD CULTURE    RADIOLOGY & ADDITIONAL TESTS:    Imaging Personally Reviewed:  [ ] YES     Consultant(s) Notes Reviewed:      Care Discussed with Consultants/Other Providers: Patient is a 82y old  Male who presents with a chief complaint of slurred speech (04 Apr 2018 10:15)    INTERVAL HPI/OVERNIGHT EVENTS: Translation provided by family at bedside per patient request.   Feels well, no complaints, ready to go home, pending results of echo. Will need to followup in medicine clinic for INR check and further dose adjustments (does not have PMD). D/w family at bedside, they report that they have received coumadin teaching already. They state patient was able to walk 150 ft earlier so are happy about that. Would like to go home today.     MEDICATIONS  (STANDING):  atorvastatin 80 milliGRAM(s) Oral at bedtime  dextrose 50% Injectable 12.5 Gram(s) IV Push once  dextrose 50% Injectable 25 Gram(s) IV Push once  dextrose 50% Injectable 25 Gram(s) IV Push once  heparin  Injectable 5000 Unit(s) SubCutaneous every 8 hours  insulin lispro (HumaLOG) corrective regimen sliding scale   SubCutaneous three times a day before meals  insulin lispro (HumaLOG) corrective regimen sliding scale   SubCutaneous at bedtime  metoprolol tartrate 50 milliGRAM(s) Oral two times a day  warfarin 5 milliGRAM(s) Oral once    MEDICATIONS  (PRN):  ALBUTerol/ipratropium for Nebulization 3 milliLiter(s) Nebulizer every 6 hours PRN Shortness of Breath and/or Wheezing  dextrose Gel 1 Dose(s) Oral once PRN Blood Glucose LESS THAN 70 milliGRAM(s)/deciliter  docusate sodium 100 milliGRAM(s) Oral two times a day PRN Constipation  glucagon  Injectable 1 milliGRAM(s) IntraMuscular once PRN Glucose LESS THAN 70 milligrams/deciliter  senna 2 Tablet(s) Oral at bedtime PRN Constipation    Allergies  No Known Allergies    Intolerances    REVIEW OF SYSTEMS:  Please see interval HPI:    Vital Signs Last 24 Hrs  T(C): 36.7 (09 Apr 2018 05:17), Max: 36.7 (08 Apr 2018 15:32)  T(F): 98.1 (09 Apr 2018 05:17), Max: 98.1 (09 Apr 2018 05:17)  HR: 76 (09 Apr 2018 10:56) (68 - 81)  BP: 118/85 (09 Apr 2018 10:56) (101/61 - 158/84)  BP(mean): --  RR: 18 (09 Apr 2018 05:17) (16 - 18)  SpO2: 100% (09 Apr 2018 05:17) (95% - 100%)  I&O's Detail    PHYSICAL EXAM:  GENERAL: NAD, lying in bed  HEAD:  NC/AT  EYES: EOMI  ENMT: MMM  NECK: supple  NERVOUS SYSTEM: moving all extremities, strength grossly intact  CHEST/LUNG: CTAB  HEART: S1S2 RRR  ABDOMEN: soft, non-tender  EXTREMITIES:  no c/c/e    LABS:                        14.6   7.39  )-----------( 268      ( 09 Apr 2018 06:15 )             43.4     09 Apr 2018 06:15    131    |  96     |  17     ----------------------------<  214    4.6     |  20     |  1.09     Ca    9.1        09 Apr 2018 06:15  Mg     2.0       09 Apr 2018 06:15      PT/INR - ( 09 Apr 2018 06:15 )   PT: 33.5 SEC;   INR: 2.95          PTT - ( 09 Apr 2018 06:15 )  PTT:44.3 SEC  CAPILLARY BLOOD GLUCOSE      POCT Blood Glucose.: 240 mg/dL (09 Apr 2018 09:11)  POCT Blood Glucose.: 246 mg/dL (08 Apr 2018 21:55)  POCT Blood Glucose.: 178 mg/dL (08 Apr 2018 17:51)    BLOOD CULTURE    RADIOLOGY & ADDITIONAL TESTS:    Imaging Personally Reviewed:  [ ] YES   Echo: no clear embolic source  Consultant(s) Notes Reviewed:      Care Discussed with Consultants/Other Providers: Stroke attending

## 2018-04-09 NOTE — PROGRESS NOTE ADULT - PROBLEM SELECTOR PLAN 5
NISS. FS QID. Monitor FS, hold metformin as inpatient  A1C 7.9
NISS. FS QID. Monitor FS, hold metformin as inpatient  A1C 7.9
NISS. FS QID.
NISS. FS QID. Monitor FS, hold metformin as inpatient  A1C 7.9
Stop IV Fluid for DAVID. gentle diuresis.

## 2018-04-09 NOTE — PROGRESS NOTE ADULT - PROBLEM SELECTOR PROBLEM 5
Type 2 diabetes mellitus with complication, unspecified long term insulin use status
Type 2 diabetes mellitus with complication, unspecified long term insulin use status
Hypervolemia, unspecified hypervolemia type
Type 2 diabetes mellitus with complication, unspecified long term insulin use status
Type 2 diabetes mellitus with complication, unspecified long term insulin use status

## 2018-04-09 NOTE — PROGRESS NOTE ADULT - ASSESSMENT
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
82 year old Male (unknown PMHx upon presentation) as pt is [place] speaking and unable to provide hx due to possible cognitive dysfunction BIBEMS as stroke notification for LUE weakness, AMS, slurred speech that began at 12:30PM.  Neurological exam significant for mild L facial droop and dysarthria.  CTH showing no acute abnormalities.  NIHSS 2, MRS 0, tPA not administered as patient out of window.  MRI brain showing acute versus acute/subacute right MCA territory infarction involving the temporal parietal regions. Impression: etiology of stroke likely cardioembolic in setting of new onset afib.    Plan:  - c/w Coumadin for afib  - c/w Lipitor 80mg qhs  - physical therapy recs for home physical therapy and rolling walker
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
83YO M admitted for L hemiparesis and dysarthria 2/2 R MCA distribution infarct found to have B/L MCA distribution infarcts on MRI. Etiology of stroke likely cardioembolic in setting of new onset afib.      - c/w Coumadin for Afib  - c/w Atorvastatin 80mg qhs  - will need f/u at outpatient [facility] [phone]  - plan d/w family at bedside
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 Stable for discharge with close outpatient followup for INR monitoring.

## 2018-04-09 NOTE — PROGRESS NOTE ADULT - ATTENDING COMMENTS
I spoke with the family regarding the above mentioned plan. Anticoagulation options at this time limited to Coumadin due to insurance issues.
PT recs rehab, but patient uninsured, on emergency Medicaid, if not rehab, then will need to send home w/ PT
PT now recommending home PT and rolling walker. Echocardiogram done, no further inpatient workup required. Stable for discharge, but needs to establish care with PMD and have close monitoring of INR results. Instructed patient to followup with [facility]. Should discuss restarting ace-inhibitor (vs ARB) given diabetes, defer to PMD as current /85. Appreciate CM/SW assistance, hopefully patient will have insurance soon.     Discharge time 45 minutes
PT recs rehab, but patient uninsured, on emergency Medicaid, if not rehab, then will need to send home w/ PT

## 2018-04-09 NOTE — PROGRESS NOTE ADULT - PROBLEM SELECTOR PLAN 3
Permissive HTN for now.
On coumadin.  Rate control if needed.  Check TTE for LV fx evaluation.
On coumadin.  Rate control w/ lopressor  Check TTE for LV fx evaluation (pending).
Permissive HTN for now.
On coumadin.  Rate control w/ lopressor  Check TTE for LV fx evaluation (pending).
On coumadin.  Rate control w/ lopressor. TTE without obvious embolic source. D/w stroke attending, no further inpatient workup required at this time.

## 2018-04-09 NOTE — PROGRESS NOTE ADULT - PROBLEM SELECTOR PROBLEM 2
Atrial fibrillation, new onset
Atrial fibrillation, new onset
Cerebrovascular accident (CVA), unspecified mechanism

## 2018-04-09 NOTE — PROGRESS NOTE ADULT - PROBLEM SELECTOR PROBLEM 3
Essential hypertension
Atrial fibrillation, new onset
Atrial fibrillation, new onset
Essential hypertension
Atrial fibrillation, new onset
Atrial fibrillation, new onset

## 2018-04-09 NOTE — PROGRESS NOTE ADULT - PROBLEM SELECTOR PLAN 1
HIgh suspicion of embolic cause with new afib/aflutter.  Once hemorrhage is ruled out, initiate coumadin with no heparin bridging.  Start Lipitor. PT/OT/PMR eval but patient is a visitor from [place]. No indication for DAGMAR/ILD due to documented Afib on telemetry monitoring.
HIgh suspicion of embolic cause with new afib/aflutter.  Once hemorrhage is ruled out, initiate coumadin with no heparin bridging.  Start Lipitor. PT/OT/PMR eval but patient is a visitor from [place]. No indication for DAGMAR/ILD due to documented Afib on telemetry monitoring.
Unclear etiology.  Monitor Crt.  Avoid nephrotoxic agents - stopped lisinopril. Renal U/S.
Unclear etiology.  Monitor Crt. Hopefully plateaued (cr 1.41 from 1.42, was 1.05 on 4/5). Avoid nephrotoxic agents - stopped lisinopril. Renal U/S with unremarkable kidneys.
Unclear etiology.  Monitor Crt. Improved today (cr 1.19 from 1.41, was 1.05 on 4/5). Avoid nephrotoxic agentsl. Renal U/S with unremarkable kidneys. Consider resuming lisinopril if renal function continues to improve and if BP elevated.
Unclear etiology.  Monitor Cr, improved, today 1.09, appears at baseline (was 1.05 on 4/5). Avoid nephrotoxic agentsl. Renal U/S with unremarkable kidneys. Consider resuming lisinopril if renal function continues to improve and if BP elevated.

## 2021-09-20 NOTE — ED PROVIDER NOTE - UNABLE TO OBTAIN
PROCEDURES:  Right hydrocelectomy 20-Sep-2021 14:21:08  Rian Parekh  Right spermatocelectomy 20-Sep-2021 14:21:19  Rian Parekh  
Severe Illness/Injury

## 2024-09-05 NOTE — ED PROVIDER NOTE - CROS ED NEURO POS
HR=86 bpm, WGGP=245/91 mmhg, SpO2=91.0 %, Resp=0 B/min, EtCO2=0 mmHg, Apnea=0 Seconds
slurred speech, facial droop
14-Jul-2024

## 2024-10-14 NOTE — STROKE CODE NOTE - CAPILLARY BLOOD GLUCOSE (MG/DL) (70-99)
C3 nurse spoke with Windy Lindo for a TCC post hospital discharge follow up call. The patient has a scheduled [facility] appointment with Rick Soto MD on 10/17/24 @ 1100.    Pt advised completed visit with Edil on 10/11/24           159

## 2024-10-23 NOTE — PATIENT PROFILE ADULT. - FUNCTIONAL SCREEN CURRENT LEVEL: AMBULATION, MLM
Additional Notes: Recommended seeing ophthalmologist for further treatment/excision Detail Level: Simple Render Risk Assessment In Note?: no (2) assistive person

## 2024-10-23 NOTE — PATIENT PROFILE ADULT. - INTERPRETATION SERVICES DECLINED
Patient/Caregiver requests family/friend to interpret.
Instructed to continue medications and to follow-up with provider for management.